# Patient Record
Sex: FEMALE | Race: WHITE | Employment: FULL TIME | URBAN - METROPOLITAN AREA
[De-identification: names, ages, dates, MRNs, and addresses within clinical notes are randomized per-mention and may not be internally consistent; named-entity substitution may affect disease eponyms.]

---

## 2017-02-02 PROBLEM — E78.5 DYSLIPIDEMIA: Status: ACTIVE | Noted: 2017-02-02

## 2017-02-02 PROBLEM — E04.9 NONTOXIC NODULAR GOITER: Status: ACTIVE | Noted: 2017-02-02

## 2017-02-02 PROBLEM — E03.8 HYPOTHYROIDISM DUE TO HASHIMOTO'S THYROIDITIS: Status: ACTIVE | Noted: 2017-02-02

## 2017-02-02 PROBLEM — E06.3 HYPOTHYROIDISM DUE TO HASHIMOTO'S THYROIDITIS: Status: ACTIVE | Noted: 2017-02-02

## 2017-02-07 PROBLEM — R53.82 CHRONIC FATIGUE: Status: ACTIVE | Noted: 2017-02-07

## 2017-02-07 PROBLEM — E78.00 PURE HYPERCHOLESTEROLEMIA: Status: ACTIVE | Noted: 2017-02-07

## 2017-02-07 PROBLEM — R60.0 ARM EDEMA: Status: ACTIVE | Noted: 2017-02-07

## 2017-03-23 ENCOUNTER — APPOINTMENT (OUTPATIENT)
Dept: LAB | Age: 57
End: 2017-03-23
Attending: INTERNAL MEDICINE
Payer: COMMERCIAL

## 2017-03-23 PROCEDURE — 88173 CYTOPATH EVAL FNA REPORT: CPT | Performed by: INTERNAL MEDICINE

## 2017-05-16 ENCOUNTER — HOSPITAL ENCOUNTER (OUTPATIENT)
Facility: HOSPITAL | Age: 57
Setting detail: OBSERVATION
Discharge: HOME | End: 2017-05-17
Attending: SURGERY | Admitting: SURGERY
Payer: COMMERCIAL

## 2017-05-16 ENCOUNTER — SURGERY (OUTPATIENT)
Age: 57
End: 2017-05-16

## 2017-05-16 ENCOUNTER — ANESTHESIA EVENT (OUTPATIENT)
Dept: SURGERY | Facility: HOSPITAL | Age: 57
End: 2017-05-16
Payer: COMMERCIAL

## 2017-05-16 ENCOUNTER — ANESTHESIA (OUTPATIENT)
Dept: SURGERY | Facility: HOSPITAL | Age: 57
End: 2017-05-16
Payer: COMMERCIAL

## 2017-05-16 PROCEDURE — 4A11X4G MONITORING OF PERIPHERAL NERVOUS ELECTRICAL ACTIVITY, INTRAOPERATIVE, EXTERNAL APPROACH: ICD-10-PCS | Performed by: SURGERY

## 2017-05-16 PROCEDURE — 0GTK0ZZ RESECTION OF THYROID GLAND, OPEN APPROACH: ICD-10-PCS | Performed by: SURGERY

## 2017-05-16 PROCEDURE — 88307 TISSUE EXAM BY PATHOLOGIST: CPT | Performed by: SURGERY

## 2017-05-16 RX ORDER — TRAMADOL HYDROCHLORIDE 50 MG/1
50 TABLET ORAL EVERY 6 HOURS PRN
Status: DISCONTINUED | OUTPATIENT
Start: 2017-05-16 | End: 2017-05-17

## 2017-05-16 RX ORDER — DEXTROSE, SODIUM CHLORIDE, AND POTASSIUM CHLORIDE 5; .45; .15 G/100ML; G/100ML; G/100ML
INJECTION INTRAVENOUS
Status: COMPLETED
Start: 2017-05-16 | End: 2017-05-16

## 2017-05-16 RX ORDER — HYDROCODONE BITARTRATE AND ACETAMINOPHEN 5; 325 MG/1; MG/1
2 TABLET ORAL EVERY 4 HOURS PRN
Status: DISCONTINUED | OUTPATIENT
Start: 2017-05-16 | End: 2017-05-17

## 2017-05-16 RX ORDER — MIRTAZAPINE 15 MG/1
45 TABLET, FILM COATED ORAL NIGHTLY
Status: DISCONTINUED | OUTPATIENT
Start: 2017-05-16 | End: 2017-05-17

## 2017-05-16 RX ORDER — SUMATRIPTAN 50 MG/1
100 TABLET, FILM COATED ORAL EVERY 2 HOUR PRN
Status: DISCONTINUED | OUTPATIENT
Start: 2017-05-16 | End: 2017-05-17

## 2017-05-16 RX ORDER — TOPIRAMATE 100 MG/1
200 TABLET, FILM COATED ORAL 2 TIMES DAILY
Status: DISCONTINUED | OUTPATIENT
Start: 2017-05-16 | End: 2017-05-16

## 2017-05-16 RX ORDER — ACETAMINOPHEN 325 MG/1
650 TABLET ORAL EVERY 4 HOURS PRN
Status: DISCONTINUED | OUTPATIENT
Start: 2017-05-16 | End: 2017-05-17

## 2017-05-16 RX ORDER — TOPIRAMATE 100 MG/1
200 TABLET, FILM COATED ORAL DAILY
Status: DISCONTINUED | OUTPATIENT
Start: 2017-05-17 | End: 2017-05-17

## 2017-05-16 RX ORDER — ONDANSETRON 2 MG/ML
4 INJECTION INTRAMUSCULAR; INTRAVENOUS EVERY 6 HOURS PRN
Status: DISCONTINUED | OUTPATIENT
Start: 2017-05-16 | End: 2017-05-17

## 2017-05-16 RX ORDER — CALCITRIOL 0.25 UG/1
0.25 CAPSULE, LIQUID FILLED ORAL DAILY
Status: DISCONTINUED | OUTPATIENT
Start: 2017-05-16 | End: 2017-05-17

## 2017-05-16 RX ORDER — HYDROCODONE BITARTRATE AND ACETAMINOPHEN 5; 325 MG/1; MG/1
1 TABLET ORAL EVERY 4 HOURS PRN
Status: DISCONTINUED | OUTPATIENT
Start: 2017-05-16 | End: 2017-05-17

## 2017-05-16 RX ORDER — SODIUM CHLORIDE, SODIUM LACTATE, POTASSIUM CHLORIDE, CALCIUM CHLORIDE 600; 310; 30; 20 MG/100ML; MG/100ML; MG/100ML; MG/100ML
INJECTION, SOLUTION INTRAVENOUS CONTINUOUS
Status: DISCONTINUED | OUTPATIENT
Start: 2017-05-16 | End: 2017-05-17

## 2017-05-16 RX ORDER — ATENOLOL 25 MG/1
25 TABLET ORAL
Status: DISCONTINUED | OUTPATIENT
Start: 2017-05-17 | End: 2017-05-17

## 2017-05-16 RX ORDER — IBUPROFEN 400 MG/1
400 TABLET ORAL EVERY 4 HOURS PRN
Status: DISCONTINUED | OUTPATIENT
Start: 2017-05-16 | End: 2017-05-17

## 2017-05-16 RX ORDER — DIAZEPAM 5 MG/1
5 TABLET ORAL NIGHTLY PRN
Status: DISCONTINUED | OUTPATIENT
Start: 2017-05-16 | End: 2017-05-17

## 2017-05-16 RX ORDER — ATORVASTATIN CALCIUM 40 MG/1
40 TABLET, FILM COATED ORAL NIGHTLY
Status: DISCONTINUED | OUTPATIENT
Start: 2017-05-16 | End: 2017-05-17

## 2017-05-16 RX ORDER — IBUPROFEN 600 MG/1
600 TABLET ORAL EVERY 4 HOURS PRN
Status: DISCONTINUED | OUTPATIENT
Start: 2017-05-16 | End: 2017-05-17

## 2017-05-16 RX ORDER — BUPIVACAINE HYDROCHLORIDE 5 MG/ML
INJECTION, SOLUTION EPIDURAL; INTRACAUDAL AS NEEDED
Status: DISCONTINUED | OUTPATIENT
Start: 2017-05-16 | End: 2017-05-16 | Stop reason: HOSPADM

## 2017-05-16 RX ORDER — NALOXONE HYDROCHLORIDE 0.4 MG/ML
80 INJECTION, SOLUTION INTRAMUSCULAR; INTRAVENOUS; SUBCUTANEOUS AS NEEDED
Status: DISCONTINUED | OUTPATIENT
Start: 2017-05-16 | End: 2017-05-16 | Stop reason: HOSPADM

## 2017-05-16 RX ORDER — DEXTROSE MONOHYDRATE 25 G/50ML
50 INJECTION, SOLUTION INTRAVENOUS
Status: DISCONTINUED | OUTPATIENT
Start: 2017-05-16 | End: 2017-05-16 | Stop reason: HOSPADM

## 2017-05-16 RX ORDER — LEVOTHYROXINE SODIUM 112 UG/1
112 TABLET ORAL
Status: DISCONTINUED | OUTPATIENT
Start: 2017-05-17 | End: 2017-05-17

## 2017-05-16 RX ORDER — IBUPROFEN 200 MG
200 TABLET ORAL EVERY 4 HOURS PRN
Status: DISCONTINUED | OUTPATIENT
Start: 2017-05-16 | End: 2017-05-17

## 2017-05-16 RX ORDER — DIPHENHYDRAMINE HCL 25 MG
25 CAPSULE ORAL NIGHTLY PRN
Status: DISCONTINUED | OUTPATIENT
Start: 2017-05-16 | End: 2017-05-17

## 2017-05-16 RX ORDER — CALCIUM GLUCONATE-DEXTROSE IV SOLN 2 GRAM/100ML-5% 2-5/100 GM/ML-%
2 SOLUTION INTRAVENOUS ONCE AS NEEDED
Status: ACTIVE | OUTPATIENT
Start: 2017-05-16 | End: 2017-05-16

## 2017-05-16 RX ORDER — DEXTROSE, SODIUM CHLORIDE, AND POTASSIUM CHLORIDE 5; .45; .15 G/100ML; G/100ML; G/100ML
INJECTION INTRAVENOUS CONTINUOUS
Status: DISCONTINUED | OUTPATIENT
Start: 2017-05-16 | End: 2017-05-17

## 2017-05-16 RX ORDER — IBUPROFEN 800 MG/1
800 TABLET ORAL EVERY 8 HOURS PRN
Qty: 30 TABLET | Refills: 1 | Status: SHIPPED | OUTPATIENT
Start: 2017-05-16 | End: 2017-07-15

## 2017-05-16 RX ORDER — DEXAMETHASONE SODIUM PHOSPHATE 4 MG/ML
4 VIAL (ML) INJECTION AS NEEDED
Status: DISCONTINUED | OUTPATIENT
Start: 2017-05-16 | End: 2017-05-16 | Stop reason: HOSPADM

## 2017-05-16 RX ORDER — TOPIRAMATE 100 MG/1
400 TABLET, FILM COATED ORAL NIGHTLY
Status: DISCONTINUED | OUTPATIENT
Start: 2017-05-16 | End: 2017-05-17

## 2017-05-16 RX ORDER — METOCLOPRAMIDE HYDROCHLORIDE 5 MG/ML
10 INJECTION INTRAMUSCULAR; INTRAVENOUS EVERY 6 HOURS PRN
Status: DISCONTINUED | OUTPATIENT
Start: 2017-05-16 | End: 2017-05-17

## 2017-05-16 RX ORDER — TRAMADOL HYDROCHLORIDE 50 MG/1
50 TABLET ORAL EVERY 6 HOURS PRN
Qty: 30 TABLET | Refills: 1 | Status: SHIPPED | OUTPATIENT
Start: 2017-05-16 | End: 2017-05-26

## 2017-05-16 RX ORDER — ONDANSETRON 2 MG/ML
4 INJECTION INTRAMUSCULAR; INTRAVENOUS AS NEEDED
Status: DISCONTINUED | OUTPATIENT
Start: 2017-05-16 | End: 2017-05-16 | Stop reason: HOSPADM

## 2017-05-16 NOTE — HISTORICAL OFFICE NOTE
The above referenced H&P on 5/8/2017 by her PCP was reviewed by Flor Freeman MD on 5/16/2017, the patient was examined and no significant changes have occurred in the patient's condition since the H&P was performed.   Risks and benefits were discussed, pr

## 2017-05-16 NOTE — PROGRESS NOTES
NURSING ADMISSION NOTE      Patient admitted via Cart  Oriented to room. Safety precautions initiated. Bed in low position. Call light in reach. PT ARRIVED TO ROOM FROM PACU AT THIS TIME IN STABLE CONDITION. A+OX3.  VITALS STABLE. 2L 02 PER NC. CPOX

## 2017-05-16 NOTE — BRIEF OP NOTE
Pre-Operative Diagnosis: NONTOXIC UNINODULAR GOITER     Post-Operative Diagnosis: NONTOXIC UNINODULAR GOITER     Procedure Performed:   TOTAL THYROIDECTOMY WITH NERVE MONITORING    Surgeon(s) and Role:     Chyna Vaughan MD - Primary    Assistant(s):

## 2017-05-16 NOTE — ANESTHESIA PREPROCEDURE EVALUATION
PRE-OP EVALUATION    Patient Name: Bryce Glasgow    Pre-op Diagnosis: NONTOXIC UNINODULAR GOITER    Procedure(s):  TOTAL THYROIDECTOMY WITH NERVE MONITORING    Surgeon(s) and Role:     Mark Jeffrey MD - Primary    Pre-op vitals reviewed. Temp: 98. headaches                     Past Surgical History    SINUS SURGERY        COLONOSCOPY  2007    Comment Repeated in 2008, Dr. Akira Matson, DONE WITH WATER    TONSILLECTOMY

## 2017-05-16 NOTE — ANESTHESIA POSTPROCEDURE EVALUATION
[facility] Patient Status:  Outpatient in a Bed   Age/Gender 65year old female MRN [id number]   [facility] SURGERY Attending Treva Boone MD   Hosp Day # 0 PCP Rafaela aRo MD       Anesthesia Post-op Note

## 2017-05-17 VITALS
BODY MASS INDEX: 38.46 KG/M2 | DIASTOLIC BLOOD PRESSURE: 69 MMHG | TEMPERATURE: 98 F | RESPIRATION RATE: 16 BRPM | SYSTOLIC BLOOD PRESSURE: 126 MMHG | HEART RATE: 78 BPM | WEIGHT: 209 LBS | OXYGEN SATURATION: 96 % | HEIGHT: 62 IN

## 2017-05-17 PROBLEM — E04.1 UNINODULAR GOITER: Status: ACTIVE | Noted: 2017-05-17

## 2017-05-17 PROCEDURE — 82310 ASSAY OF CALCIUM: CPT | Performed by: SURGERY

## 2017-05-17 RX ORDER — CALCITRIOL 0.5 UG/1
0.5 CAPSULE, LIQUID FILLED ORAL 2 TIMES DAILY
Qty: 28 CAPSULE | Refills: 0 | Status: SHIPPED | OUTPATIENT
Start: 2017-05-17 | End: 2017-05-31

## 2017-05-17 NOTE — PROGRESS NOTES
NURSING DISCHARGE NOTE    Discharged Home via Wheelchair. Accompanied by Family member and Support staff  Belongings Taken by patient/family DISCUSSED D/C INSTRUCTIONS WITH PT AND FAMILY . ANSWERED ALL QUESTIONS , VERBALIZED UNDERSTANDING .  SCRIPT FOR

## 2017-05-17 NOTE — OPERATIVE REPORT
[facility]    PATIENT'S NAME: Dior Brothers   ATTENDING PHYSICIAN: Camden Centeno M.D. OPERATING PHYSICIAN: Camden Centeno M.D.    PATIENT ACCOUNT#:   [de-identified]    LOCATION:  [address] [place] [address]  MEDICAL RECORD #:   [id number]       DATE OF BIRTH:  [birth year] area was prepped and draped into a sterile field. Lower Kocher neck incision was made with a 15 blade knife. Electric Bovie cautery used to separate the subcutaneous tissue, and superior and inferior flaps raised. Deep cervical fascia was identified.   Roby Simmonds 14:32:59  [facility] [id number]  TL/

## 2017-05-17 NOTE — PAYOR COMM NOTE
Attending Physician: Marielena Starr MD    Review Type: ADMISSION   Reviewer: Milan Hammans       Date: May 17, 2017 - 9:19 AM  Payor: [id number] Number: N/A  Admit date: 5/16/2017 10:16 AM     [place]     ( (CEPACOL (SUGAR FREE)) 1 lozenge     Date Action Dose Route User    5/17/2017 0848 Given 1 lozenge Oral Demetris Archer RN    5/17/2017 0357 Given 1 lozenge Oral Ousmane Mamadou, RN    5/16/2017 2144 Given 1 lozenge Oral Ousmane Mamadou, RN    5/16/2017 2123 Gi Dose Route User    5/17/2017 0521 Given 112 mcg Oral Daysi Coleman RN      mirtazapine (REMERON) tab 45 mg     Date Action Dose Route User    5/16/2017 2035 Given 45 mg Oral Daysi Coleman RN      topiramate (Topamax) tab 200 mg     Date Action Dose Route

## 2017-05-17 NOTE — PROGRESS NOTES
[facility]  Progress Note    [place] Patient Status:  Outpatient in a Bed    3/26/1960 MRN [id number]   [facility] 3NW-A Attending Marielena Starr MD   Hosp Day # 1 PCP Anuel Flores MD     Subjective:    Patient reports

## 2017-05-19 NOTE — PROGRESS NOTES
Quick Note:    Quirino Harrington, please notify patient with final pathology showed no cancer  ______

## 2017-05-25 PROCEDURE — 83970 ASSAY OF PARATHORMONE: CPT | Performed by: INTERNAL MEDICINE

## 2017-07-06 PROCEDURE — 83970 ASSAY OF PARATHORMONE: CPT | Performed by: INTERNAL MEDICINE

## 2017-07-18 PROBLEM — E04.9 NONTOXIC NODULAR GOITER: Status: RESOLVED | Noted: 2017-02-02 | Resolved: 2017-07-18

## 2017-08-08 PROBLEM — I10 ESSENTIAL HYPERTENSION: Status: ACTIVE | Noted: 2017-08-08

## 2017-08-31 DIAGNOSIS — G43.901: ICD-10-CM

## 2017-08-31 NOTE — TELEPHONE ENCOUNTER
Medication: Topamax    Date of last refill: 12/8/16 for #270/3 additional refills  [place]    Last office visit: 12/8/16  Due back to clinic per last office note:  1 year  Date next office visit scheduled:  not yet s

## 2017-09-27 PROCEDURE — 82607 VITAMIN B-12: CPT | Performed by: INTERNAL MEDICINE

## 2017-10-24 DIAGNOSIS — G43.901: ICD-10-CM

## 2017-10-24 NOTE — TELEPHONE ENCOUNTER
Medication: Topiramate 200mg     Date of last refill: 12/8/16  [place]     Last office visit: 12/8/16  Due back to clinic per last office note:  1 year  Date next office visit scheduled:  No appointment     Last OV note

## 2017-11-01 NOTE — TELEPHONE ENCOUNTER
Pt almost due for 1 year follow up (due 12/8/17). LMTCB to schedule appt, then we can provide 1 90-day supply to mail order pharmacy, to cover until appt date. Upon return call, please assist with scheduling follow up.     Medication: MIRTAZAPINE 45 MG Or

## 2018-01-03 ENCOUNTER — TELEPHONE (OUTPATIENT)
Dept: NEUROLOGY | Facility: CLINIC | Age: 58
End: 2018-01-03

## 2018-01-05 ENCOUNTER — OFFICE VISIT (OUTPATIENT)
Dept: NEUROLOGY | Facility: CLINIC | Age: 58
End: 2018-01-05

## 2018-01-05 VITALS
WEIGHT: 194 LBS | DIASTOLIC BLOOD PRESSURE: 84 MMHG | RESPIRATION RATE: 18 BRPM | HEART RATE: 78 BPM | BODY MASS INDEX: 34 KG/M2 | SYSTOLIC BLOOD PRESSURE: 124 MMHG

## 2018-01-05 DIAGNOSIS — R20.2 TINGLING IN EXTREMITIES: ICD-10-CM

## 2018-01-05 DIAGNOSIS — G43.001 MIGRAINE WITHOUT AURA AND WITH STATUS MIGRAINOSUS, NOT INTRACTABLE: ICD-10-CM

## 2018-01-05 DIAGNOSIS — G43.009 NONINTRACTABLE MIGRAINE, UNSPECIFIED MIGRAINE TYPE: ICD-10-CM

## 2018-01-05 DIAGNOSIS — R25.1 TREMOR OF BOTH HANDS: ICD-10-CM

## 2018-01-05 DIAGNOSIS — G43.831 MENSTRUAL MIGRAINE, WITH INTRACTABLE MIGRAINE, SO STATED, WITH STATUS MIGRAINOSUS: Primary | ICD-10-CM

## 2018-01-05 DIAGNOSIS — F32.4 MAJOR DEPRESSIVE DISORDER WITH SINGLE EPISODE, IN PARTIAL REMISSION (HCC): ICD-10-CM

## 2018-01-05 DIAGNOSIS — F32.89 OTHER DEPRESSION: ICD-10-CM

## 2018-01-05 PROCEDURE — 99215 OFFICE O/P EST HI 40 MIN: CPT | Performed by: OTHER

## 2018-01-05 RX ORDER — MIRTAZAPINE 45 MG/1
TABLET, FILM COATED ORAL
Qty: 90 TABLET | Refills: 3 | Status: SHIPPED | OUTPATIENT
Start: 2018-01-05 | End: 2018-06-07

## 2018-01-05 RX ORDER — SUMATRIPTAN 100 MG/1
100 TABLET, FILM COATED ORAL AS NEEDED
Qty: 27 TABLET | Refills: 3 | Status: SHIPPED | OUTPATIENT
Start: 2018-01-05 | End: 2018-06-07

## 2018-01-05 NOTE — PATIENT INSTRUCTIONS
Refill policies:    • Allow 2-3 business days for refills; controlled substances may take longer.   • Contact your pharmacy at least 5 days prior to running out of medication and have them send an electronic request or submit request through the [facility] have a procedure or additional testing performed. [facility]) will contact your insurance carrier to obtain pre-certification or prior authorization.     Unfortunately, CIRA has seen an increase in denial of payment even though the p

## 2018-01-05 NOTE — PROGRESS NOTES
[facility] in [place] with [facility]  Neurology - Clinic Follow up  2018    Roseanne Mathias Patient Status:  No patient class for patient encounter    3/26/1960 MRN [id number]   COUM RHABDOMYOLYSIS AFTER ABLATION, DONE                WITH WATER  9/24/09: HYSTERECTOMY      Comment: Supracervical hyst & BSO  No date: SINUS SURGERY    No date: THYROIDECTOMY  No date: TONSILLECTOMY  3/11: TOTAL KNEE REPLACEMENT Right  No date: TUBAL Sherlyn Rae 1   TABLETS DAILY Disp:  Rfl:    Pantoprazole Sodium 20 MG Oral Tab EC Take 1 tablet (20 mg total) by mouth 2 (two) times daily before meals. Disp: 28 tablet Rfl: 0   diazepam (VALIUM) 5 MG Oral Tab Take 5 mg by mouth nightly as needed.    Disp:  Rfl: 0 Light touch, pin and vibration in ankles and fingers,  position sense is normal  DTRs   Symmetric 2/4 in all limbs,   No Babinski sign,   COORDINATION: Normal FTN and HTS tests,   GAIT:  Normal gait, can do  tandem walk, romberg test is negative,         P mirtazapine 45 MG Oral Tab    (G43.009) Nonintractable migraine, unspecified migraine type  Plan: SUMAtriptan Succinate (IMITREX) 100 MG Oral Tab      Summery:  She has common  Migraine, better controlled,   Depression is better controlled,   Keep on  barbara

## 2018-01-05 NOTE — PROGRESS NOTES
Patient here to follow up regarding headaches. States that she has been doing ok. Has been having tingling in hands and feet.

## 2018-01-22 ENCOUNTER — TELEPHONE (OUTPATIENT)
Dept: NEUROLOGY | Facility: CLINIC | Age: 58
End: 2018-01-22

## 2018-01-22 RX ORDER — ONDANSETRON 4 MG/1
4 TABLET, ORALLY DISINTEGRATING ORAL EVERY 8 HOURS PRN
Qty: 20 TABLET | Refills: 0 | Status: SHIPPED | OUTPATIENT
Start: 2018-01-22 | End: 2018-03-08

## 2018-01-24 ENCOUNTER — LAB ENCOUNTER (OUTPATIENT)
Dept: LAB | Age: 58
End: 2018-01-24
Attending: Other
Payer: COMMERCIAL

## 2018-01-24 DIAGNOSIS — R20.2 PARESTHESIA: Primary | ICD-10-CM

## 2018-01-24 PROCEDURE — 84165 PROTEIN E-PHORESIS SERUM: CPT

## 2018-01-24 PROCEDURE — 83883 ASSAY NEPHELOMETRY NOT SPEC: CPT

## 2018-01-24 PROCEDURE — 86235 NUCLEAR ANTIGEN ANTIBODY: CPT

## 2018-01-24 PROCEDURE — 84207 ASSAY OF VITAMIN B-6: CPT

## 2018-01-24 PROCEDURE — 86334 IMMUNOFIX E-PHORESIS SERUM: CPT

## 2018-01-24 PROCEDURE — 84446 ASSAY OF VITAMIN E: CPT

## 2018-01-24 PROCEDURE — 86431 RHEUMATOID FACTOR QUANT: CPT

## 2018-01-25 LAB — RHEUMATOID FACT SERPL-ACNC: <10 IU/ML (ref ?–15)

## 2018-01-26 LAB
SSA AUTOAB: <100 AU/ML (ref ?–100)
SSB AUTOAB: <100 AU/ML (ref ?–100)

## 2018-01-28 LAB
ALPHA-TOCOPHEROL (VIT E) -MG/L: 7.4 MG/L
GAMMA-TOCOPHEROL (VIT E) -MG/L: 1.3 MG/L
VITAMIN B6: 12 NMOL/L

## 2018-01-30 ENCOUNTER — TELEPHONE (OUTPATIENT)
Dept: NEUROLOGY | Facility: CLINIC | Age: 58
End: 2018-01-30

## 2018-01-30 LAB
A/G RATIO: 1.47
ALBUMIN, SERUM: 4.34 G/DL (ref 3.5–4.8)
ALPHA-1 GLOBULIN: 0.24 G/DL (ref 0.1–0.3)
ALPHA-2 GLOBULIN: 0.81 G/DL (ref 0.6–1)
BETA GLOBULIN: 0.81 G/DL (ref 0.7–1.2)
GAMMA GLOBULIN: 1.1 G/DL (ref 0.6–1.6)
KAPPA FREE LIGHT CHAIN: 3.15 MG/DL (ref 0.33–1.94)
KAPPA/LAMBDA FLC RATIO: 2.05 (ref 0.26–1.65)
LAMBDA FREE LIGHT CHAIN: 1.53 MG/DL (ref 0.57–2.63)
TOTAL PROTEIN,SERUM: 7.3 G/DL (ref 6.1–8.3)

## 2018-01-30 NOTE — TELEPHONE ENCOUNTER
----- Message from Baljinder Mendoza MD sent at 1/30/2018  9:09 AM CST -----  She has high kappa protein of unclear significance,  need to see hematologist

## 2018-01-30 NOTE — TELEPHONE ENCOUNTER
----- Message from Devendra Bland MD sent at 1/29/2018  1:34 PM CST -----  V B-6 is low, take B complex daily or B6 one a day,

## 2018-01-30 NOTE — TELEPHONE ENCOUNTER
Patient notified of results and recommendations below. She verbalized understanding. Patient stated she will discuss with Dr Teresa Nuno at [facility] appt 2/7/18.

## 2018-02-07 ENCOUNTER — OFFICE VISIT (OUTPATIENT)
Dept: ELECTROPHYSIOLOGY | Facility: HOSPITAL | Age: 58
End: 2018-02-07
Attending: Other
Payer: COMMERCIAL

## 2018-02-07 DIAGNOSIS — M25.561 ARTHRALGIA OF BOTH LOWER LEGS: ICD-10-CM

## 2018-02-07 DIAGNOSIS — R25.1 TREMOR OF BOTH HANDS: ICD-10-CM

## 2018-02-07 DIAGNOSIS — R20.2 TINGLING IN EXTREMITIES: ICD-10-CM

## 2018-02-07 DIAGNOSIS — M25.562 ARTHRALGIA OF BOTH LOWER LEGS: ICD-10-CM

## 2018-02-07 PROCEDURE — 84156 ASSAY OF PROTEIN URINE: CPT | Performed by: INTERNAL MEDICINE

## 2018-02-07 PROCEDURE — 83883 ASSAY NEPHELOMETRY NOT SPEC: CPT | Performed by: INTERNAL MEDICINE

## 2018-02-07 PROCEDURE — 95913 NRV CNDJ TEST 13/> STUDIES: CPT | Performed by: OTHER

## 2018-02-07 PROCEDURE — 36415 COLL VENOUS BLD VENIPUNCTURE: CPT | Performed by: INTERNAL MEDICINE

## 2018-02-07 PROCEDURE — 95886 MUSC TEST DONE W/N TEST COMP: CPT | Performed by: OTHER

## 2018-02-07 PROCEDURE — 86335 IMMUNFIX E-PHORSIS/URINE/CSF: CPT | Performed by: INTERNAL MEDICINE

## 2018-02-07 NOTE — PROCEDURES
[facility] [place] 2] [address]  [place], [address] [address]      PATIENT'S NAME: Tahir Walker   REFERRING PHYSICIAN: Berny Eisenberg M.D.    PATIENT ACCOUNT #: [de-identified] LOCATION: [facility]   MEDICAL RECORD #: [id number] DATE OF BIRTH: [birth year]

## 2018-03-08 ENCOUNTER — OFFICE VISIT (OUTPATIENT)
Dept: NEUROLOGY | Facility: CLINIC | Age: 58
End: 2018-03-08

## 2018-03-08 VITALS
SYSTOLIC BLOOD PRESSURE: 128 MMHG | WEIGHT: 197 LBS | HEART RATE: 84 BPM | BODY MASS INDEX: 35 KG/M2 | DIASTOLIC BLOOD PRESSURE: 78 MMHG | RESPIRATION RATE: 16 BRPM

## 2018-03-08 DIAGNOSIS — G43.011 INTRACTABLE MIGRAINE WITHOUT AURA AND WITH STATUS MIGRAINOSUS: ICD-10-CM

## 2018-03-08 DIAGNOSIS — F32.0 CURRENT MILD EPISODE OF MAJOR DEPRESSIVE DISORDER WITHOUT PRIOR EPISODE (HCC): ICD-10-CM

## 2018-03-08 DIAGNOSIS — M54.2 NECK PAIN, BILATERAL: ICD-10-CM

## 2018-03-08 DIAGNOSIS — M25.519 ARTHRALGIA OF SHOULDER, UNSPECIFIED LATERALITY: ICD-10-CM

## 2018-03-08 DIAGNOSIS — E78.5 DYSLIPIDEMIA: ICD-10-CM

## 2018-03-08 DIAGNOSIS — R53.82 CHRONIC FATIGUE: ICD-10-CM

## 2018-03-08 DIAGNOSIS — R20.2 TINGLING IN EXTREMITIES: Primary | ICD-10-CM

## 2018-03-08 DIAGNOSIS — G47.33 OSA (OBSTRUCTIVE SLEEP APNEA): ICD-10-CM

## 2018-03-08 PROBLEM — M25.50 JOINT PAIN: Status: ACTIVE | Noted: 2018-03-08

## 2018-03-08 PROCEDURE — 96372 THER/PROPH/DIAG INJ SC/IM: CPT | Performed by: OTHER

## 2018-03-08 PROCEDURE — 99214 OFFICE O/P EST MOD 30 MIN: CPT | Performed by: OTHER

## 2018-03-08 RX ORDER — METHYLPREDNISOLONE 4 MG/1
TABLET ORAL
Qty: 1 PACKAGE | Refills: 0 | Status: SHIPPED | OUTPATIENT
Start: 2018-03-08 | End: 2018-05-23 | Stop reason: ALTCHOICE

## 2018-03-08 RX ORDER — KETOROLAC TROMETHAMINE 30 MG/ML
30 INJECTION, SOLUTION INTRAMUSCULAR; INTRAVENOUS ONCE
Status: COMPLETED | OUTPATIENT
Start: 2018-03-08 | End: 2018-03-08

## 2018-03-08 RX ORDER — DEXAMETHASONE SODIUM PHOSPHATE 10 MG/ML
10 INJECTION, SOLUTION INTRAMUSCULAR; INTRAVENOUS ONCE
Status: COMPLETED | OUTPATIENT
Start: 2018-03-08 | End: 2018-03-08

## 2018-03-08 RX ADMIN — DEXAMETHASONE SODIUM PHOSPHATE 10 MG: 10 INJECTION, SOLUTION INTRAMUSCULAR; INTRAVENOUS at 12:06:00

## 2018-03-08 RX ADMIN — KETOROLAC TROMETHAMINE 30 MG: 30 INJECTION, SOLUTION INTRAMUSCULAR; INTRAVENOUS at 12:07:00

## 2018-03-08 NOTE — PATIENT INSTRUCTIONS
Refill policies:    • Allow 2-3 business days for refills; controlled substances may take longer.   • Contact your pharmacy at least 5 days prior to running out of medication and have them send an electronic request or submit request through the [facility] recommended that you have a procedure or additional testing performed. [facility]) will contact your insurance carrier to obtain pre-certification or prior authorization.     Unfortunately, CIRA has seen an increase in denial of paym

## 2018-03-08 NOTE — PROCEDURES
Toradol 30mg and Decadron 10mg IM injections given per VORB. See MAR for admin details. Consent form signed prior to injections. Pt tolerated well.

## 2018-03-08 NOTE — PROGRESS NOTES
[facility] in [place] with [facility]  Neurology - Clinic Follow up  2018    Miguel A Jaquez Patient Status:  No patient class for patient encounter    3/26/1960 MRN [id number]   [facility] Comment: PT HAD RHABDOMYOLYSIS AFTER ABLATION, DONE                WITH WATER  9/24/09: HYSTERECTOMY      Comment: Supracervical hyst & BSO  No date: SINUS SURGERY    No date: THYROIDECTOMY  No date: TONSILLECTOMY  3/11: TOTAL KNEE REPLACEMENT Right  No da (ZOFRAN ODT) 4 MG Oral Tablet Dispersible Take 1 tablet (4 mg total) by mouth every 8 (eight) hours as needed for Nausea. Disp: 20 tablet Rfl: 0   Atorvastatin Calcium 40 MG Oral Tab Take 40 mg by mouth nightly.  Disp:  Rfl:    Venlafaxine HCl  MG Ora both arms and legs, no tremor of any kind;  Sensory: decreased Light touch, pin and vibration in ankles and fingers,  position sense is normal  DTRs   Symmetric 2/4 in all limbs,   No Babinski sign,   COORDINATION: Normal FTN and HTS tests,   GAIT:  Normal 30         MG/ML injection 30 mg    (F32.0) Current mild episode of major depressive disorder without prior episode (HCC)      Rikki:  She has common  Migraine,  Flare up with her current stress,   Will give her Toradol and decadron injection today, medr

## 2018-03-08 NOTE — PROGRESS NOTES
Pt here for follow up for headaches. Pt currently has a throbbing headache, which has been present for one week.

## 2018-05-01 NOTE — TELEPHONE ENCOUNTER
From: Sandra Taveras  Sent: 4/30/2018 6:09 PM CDT  Subject: Medication Renewal Request    Sandra Taveras would like a refill of the following medications:     Diclofenac Sodium 50 MG Oral Tab EC Reinier Reddy MD]   Patient Comment: also can i get a new p

## 2018-05-01 NOTE — TELEPHONE ENCOUNTER
Medication: Diclofenac 50mg     Date of last refill: 3/8/18  [place]     Last office visit: 3/8/18  Due back to clinic per last office note:  3 months  Date next office visit scheduled:  6/7/18    Last OV note recommenda

## 2018-06-07 ENCOUNTER — OFFICE VISIT (OUTPATIENT)
Dept: NEUROLOGY | Facility: CLINIC | Age: 58
End: 2018-06-07

## 2018-06-07 VITALS
HEART RATE: 88 BPM | WEIGHT: 191 LBS | SYSTOLIC BLOOD PRESSURE: 110 MMHG | DIASTOLIC BLOOD PRESSURE: 80 MMHG | BODY MASS INDEX: 35 KG/M2

## 2018-06-07 DIAGNOSIS — R25.1 TREMOR OF BOTH HANDS: ICD-10-CM

## 2018-06-07 DIAGNOSIS — G43.009 NONINTRACTABLE MIGRAINE, UNSPECIFIED MIGRAINE TYPE: ICD-10-CM

## 2018-06-07 DIAGNOSIS — K21.00 GASTROESOPHAGEAL REFLUX DISEASE WITH ESOPHAGITIS: ICD-10-CM

## 2018-06-07 DIAGNOSIS — M35.3 POLYMYALGIA (HCC): ICD-10-CM

## 2018-06-07 DIAGNOSIS — R63.5 ABNORMAL WEIGHT GAIN: ICD-10-CM

## 2018-06-07 DIAGNOSIS — R53.82 CHRONIC FATIGUE: ICD-10-CM

## 2018-06-07 DIAGNOSIS — R10.13 EPIGASTRIC DISCOMFORT: ICD-10-CM

## 2018-06-07 DIAGNOSIS — F32.89 OTHER DEPRESSION: ICD-10-CM

## 2018-06-07 DIAGNOSIS — G43.001 MIGRAINE WITHOUT AURA AND WITH STATUS MIGRAINOSUS, NOT INTRACTABLE: ICD-10-CM

## 2018-06-07 DIAGNOSIS — R20.2 TINGLING IN EXTREMITIES: ICD-10-CM

## 2018-06-07 DIAGNOSIS — G43.831 MENSTRUAL MIGRAINE, WITH INTRACTABLE MIGRAINE, SO STATED, WITH STATUS MIGRAINOSUS: Primary | ICD-10-CM

## 2018-06-07 PROCEDURE — 96372 THER/PROPH/DIAG INJ SC/IM: CPT | Performed by: OTHER

## 2018-06-07 PROCEDURE — 99214 OFFICE O/P EST MOD 30 MIN: CPT | Performed by: OTHER

## 2018-06-07 RX ORDER — DEXAMETHASONE SODIUM PHOSPHATE 10 MG/ML
10 INJECTION, SOLUTION INTRAMUSCULAR; INTRAVENOUS ONCE
Status: COMPLETED | OUTPATIENT
Start: 2018-06-07 | End: 2018-06-07

## 2018-06-07 RX ORDER — ONDANSETRON 4 MG/1
4 TABLET, ORALLY DISINTEGRATING ORAL EVERY 8 HOURS PRN
Qty: 20 TABLET | Refills: 1 | Status: SHIPPED | OUTPATIENT
Start: 2018-06-07 | End: 2019-10-22 | Stop reason: ALTCHOICE

## 2018-06-07 RX ORDER — SUMATRIPTAN 100 MG/1
100 TABLET, FILM COATED ORAL AS NEEDED
Qty: 27 TABLET | Refills: 3 | Status: SHIPPED | OUTPATIENT
Start: 2018-06-07 | End: 2019-01-28

## 2018-06-07 RX ORDER — MIRTAZAPINE 45 MG/1
TABLET, FILM COATED ORAL
Qty: 90 TABLET | Refills: 3 | Status: SHIPPED | OUTPATIENT
Start: 2018-06-07 | End: 2019-01-28

## 2018-06-07 RX ORDER — KETOROLAC TROMETHAMINE 30 MG/ML
30 INJECTION, SOLUTION INTRAMUSCULAR; INTRAVENOUS ONCE
Status: COMPLETED | OUTPATIENT
Start: 2018-06-07 | End: 2018-06-07

## 2018-06-07 RX ADMIN — KETOROLAC TROMETHAMINE 30 MG: 30 INJECTION, SOLUTION INTRAMUSCULAR; INTRAVENOUS at 12:05:00

## 2018-06-07 RX ADMIN — DEXAMETHASONE SODIUM PHOSPHATE 10 MG: 10 INJECTION, SOLUTION INTRAMUSCULAR; INTRAVENOUS at 12:04:00

## 2018-06-07 NOTE — PROGRESS NOTES
[facility] in [place] with [facility]  Neurology - Clinic Follow up  2018    Jaqueline Ramirez Patient Status:  No patient class for patient encounter    3/26/1960 MRN [id number]   NDQE HISTORY:  Past Surgical History:  2013: ARTHROSCOPY OF JOINT UNLISTED Right      Comment: KNEE  No date:   2007: COLONOSCOPY      Comment: Repeated in , Dr. Sheridan Manley: CYST ASPIRATION LEFT  No date: ENDOMETRIAL ABLATION      Comment: PT HAD RHABD Oral Cap 10,000 Units once a week. Sundays  Disp:  Rfl:    Calcium Carb-Cholecalciferol ([employer] CALCIUM 600 & VITAMIN D3 OR) daily.    Disp:  Rfl:    VENTOLIN  (90 Base) MCG/ACT Inhalation Aero Soln USE 1-2 INHALATIONS EVERY 4-6 HOURS AS NEEDED FOR WHEE to XII, visual field is full, EOMI, pupil symmetrical, light reflexes are present, fundus exam is normal. Face symmetrical with normal sensation, hearing normal, shoulder shrugging normal.   Motor:  NO drift.  TARIQ intact, normal tone, normal strength in bot Medications    mirtazapine 45 MG Oral Tab    Tremor of both hands    Chronic fatigue    Tingling in extremities      Other Visit Diagnoses     Epigastric discomfort        Relevant Medications    B Complex Vitamins (VITAMIN B COMPLEX OR)    Diclofenac Sodi Oral Tablet         Dispersible    (R25.1) Tremor of both hands    (G43.001) Migraine without aura and with status migrainosus, not intractable  Plan: topiramate 200 MG Oral Tab, ketorolac         tromethamine (TORADOL) 30 MG/ML injection 30         mg, de

## 2018-06-07 NOTE — PATIENT INSTRUCTIONS
Refill policies:    • Allow 2-3 business days for refills; controlled substances may take longer.   • Contact your pharmacy at least 5 days prior to running out of medication and have them send an electronic request or submit request through the “request re entire amount billed. Precertification and Prior Authorizations: If your physician has recommended that you have a procedure or additional testing performed.   [facility]) will contact your insurance carrier to obtain pre-certi

## 2018-06-15 ENCOUNTER — TELEPHONE (OUTPATIENT)
Dept: NEUROLOGY | Facility: CLINIC | Age: 58
End: 2018-06-15

## 2018-06-15 DIAGNOSIS — G43.011 INTRACTABLE MIGRAINE WITHOUT AURA AND WITH STATUS MIGRAINOSUS: Primary | ICD-10-CM

## 2018-06-15 NOTE — TELEPHONE ENCOUNTER
Patient to start [address] [place] [address] for migraines. While in office, patient completed and signed a Service Request Form. Rx benefits confirmed with patient. Completed Service Request Form endorsed to Dr. Jayesh Mallory for signature.

## 2018-06-19 NOTE — TELEPHONE ENCOUNTER
SRF reviewed and signed by Dr. Rosa Zabala. Completed Service Request Form faxed to [facility] along with face sheet and insurance card. Pt to receive 2 free months of medication from [facility] program.  Service Request Form sent to scanning.   Lyle navarro

## 2018-06-28 NOTE — TELEPHONE ENCOUNTER
Patient called back. Relayed below. Informed patient to expect a call from Linda Vizcarra and the specialty pharmacy to dispense two free month trials. Patient verbalized understanding. No further questions.

## 2018-06-28 NOTE — TELEPHONE ENCOUNTER
Received approval from [facility] for [address]. Aimovig 70 mg/ml autoinjector approved, effective 06/27/18 through 12/27/18    No PA# provided.    Patient ID# [id number]  Request ID# [id number]      Left a detailed message on patient's confidential voicemail (ok

## 2018-07-26 NOTE — TELEPHONE ENCOUNTER
Spoke with patient, provided [facility]  numbers to call. Explained that pharmacy may be backed up with new starts as this is a new medication. Verbalized understanding. Appreciative of call.

## 2018-07-30 DIAGNOSIS — R10.13 EPIGASTRIC DISCOMFORT: ICD-10-CM

## 2018-07-30 DIAGNOSIS — M35.3 POLYMYALGIA (HCC): ICD-10-CM

## 2018-07-30 DIAGNOSIS — G43.831 MENSTRUAL MIGRAINE, WITH INTRACTABLE MIGRAINE, SO STATED, WITH STATUS MIGRAINOSUS: ICD-10-CM

## 2018-07-30 DIAGNOSIS — K21.00 GASTROESOPHAGEAL REFLUX DISEASE WITH ESOPHAGITIS: ICD-10-CM

## 2018-07-31 NOTE — TELEPHONE ENCOUNTER
Medication: Diclofenac 50mg     Date of last refill: 6/7/18  [place]     Last office visit: 6/7/2018  Due back to clinic per last office note:  3 months  Date next office visit scheduled:  Future Appointments  Date Time

## 2018-08-01 NOTE — TELEPHONE ENCOUNTER
Rec'd call on 7/31/18 from [employer] from [employer] indicating that the Dx code is a non-covered dx for this medication. Asking that this be updated and faxed back. Can just cross off on original form and fax back.     Note that the dx code placed on the form

## 2018-08-16 ENCOUNTER — TELEPHONE (OUTPATIENT)
Dept: NEUROLOGY | Facility: CLINIC | Age: 58
End: 2018-08-16

## 2018-08-16 DIAGNOSIS — G43.011 INTRACTABLE MIGRAINE WITHOUT AURA AND WITH STATUS MIGRAINOSUS: ICD-10-CM

## 2018-08-16 RX ORDER — METHYLPREDNISOLONE 4 MG/1
TABLET ORAL
Qty: 1 PACKAGE | Refills: 0 | Status: SHIPPED | OUTPATIENT
Start: 2018-08-16 | End: 2018-09-06 | Stop reason: ALTCHOICE

## 2018-08-16 NOTE — TELEPHONE ENCOUNTER
S:  Patient states she has been trying to get a hold of someone regarding getting her free Radha Girt, but has been on hold up to 45 minutes without being able to speak with anyone. Patient states she is having daily headaches, some days worse than others.  A

## 2018-08-16 NOTE — TELEPHONE ENCOUNTER
Received approval of [address] [place] [address], Request ID [id number], valid 6/27/18-12/27/18.  Left message on patient identified voicemail (ok with HIPPA consent) informing patient and asking if she would like us to send RX to pharmacy while she is waiting for free trial fro

## 2018-08-17 NOTE — TELEPHONE ENCOUNTER
Both prescriptions signed by Dr. Ledy Armstrong. Left detailed message on confidential voice mail (OK per HIPAA) relaying this. Encouraged pt to c/b office with any questions.

## 2018-09-06 ENCOUNTER — TELEPHONE (OUTPATIENT)
Dept: NEUROLOGY | Facility: CLINIC | Age: 58
End: 2018-09-06

## 2018-09-06 ENCOUNTER — OFFICE VISIT (OUTPATIENT)
Dept: NEUROLOGY | Facility: CLINIC | Age: 58
End: 2018-09-06
Payer: COMMERCIAL

## 2018-09-06 VITALS
DIASTOLIC BLOOD PRESSURE: 76 MMHG | HEART RATE: 96 BPM | HEIGHT: 62 IN | BODY MASS INDEX: 35.7 KG/M2 | WEIGHT: 194 LBS | SYSTOLIC BLOOD PRESSURE: 122 MMHG | RESPIRATION RATE: 16 BRPM

## 2018-09-06 DIAGNOSIS — R25.1 TREMOR OF BOTH HANDS: ICD-10-CM

## 2018-09-06 DIAGNOSIS — G43.001 MIGRAINE WITHOUT AURA AND WITH STATUS MIGRAINOSUS, NOT INTRACTABLE: ICD-10-CM

## 2018-09-06 DIAGNOSIS — M54.2 NECK PAIN, BILATERAL: ICD-10-CM

## 2018-09-06 DIAGNOSIS — R20.2 TINGLING IN EXTREMITIES: Primary | ICD-10-CM

## 2018-09-06 DIAGNOSIS — F51.01 PRIMARY INSOMNIA: ICD-10-CM

## 2018-09-06 DIAGNOSIS — G43.011 INTRACTABLE MIGRAINE WITHOUT AURA AND WITH STATUS MIGRAINOSUS: ICD-10-CM

## 2018-09-06 PROCEDURE — 99214 OFFICE O/P EST MOD 30 MIN: CPT | Performed by: OTHER

## 2018-09-06 NOTE — TELEPHONE ENCOUNTER
Cece RN from [facility] returned call. She states that she sees in their system that all forms were received, but then somehow the pts case was closed. Therefore, delivery of the free medication has not been addressed.   She has escalated the case, and will

## 2018-09-06 NOTE — TELEPHONE ENCOUNTER
Pt seen in office today, and is requesting status update on Aimovig free trial doses. States she has contacted the [employer] several times and was put on hold for 30-60 minutes each time, never getting to speak to anyone. She is very frustrated.     Pt w

## 2018-09-06 NOTE — PROGRESS NOTES
[facility] in [place] with [facility]  Neurology - Clinic Follow up  2018    Robert Zhu Patient Status:  No patient class for patient encounter    3/26/1960 MRN [id number]   NDIM ITCHING  Morphine Sulfate        HIVES, ITCHING  Radiology Contrast *        Comment:ALLERGIC TO SEAFOOD, NEVER HAD AN ACTUAL REACTION             TO CONTRAST  Seasonal                  Meperidine              ITCHING    MEDICATIONS: EMR reviewed    Meryl nightly. Disp:  Rfl:          REVIEW OF SYSTEMS:    General: denies any fever or chills. Eye: no pain or redness;  Ear, , no hearing change, no throat pain or soreness; Respiratory: Denies: Difficulty Breathing, Chronic Cough and Wheezing.   Cardiovasc romberg test is negative,     EMG:    IMPRESSION:  This is an abnormal study.   The findings are highly suggestive of chronic mild bilateral lower cervical and lower lumbar root irritation or chronic radiculopathy in both C6-7, C7-8 and L4-5, L5-S1 levels b on her own,     Depression is better controlled, flare up with stress,   Keep on  remeron to 45 mg qhs,   Keep on Topamax to 200 mg am, 400 mg pm,  and Imitrex PRN,  Get aimovig monthly injection,   Even this office is too far, but she chose to come back t

## 2018-09-06 NOTE — PATIENT INSTRUCTIONS
Refill policies:    • Allow 2-3 business days for refills; controlled substances may take longer.   • Contact your pharmacy at least 5 days prior to running out of medication and have them send an electronic request or submit request through the “request re entire amount billed. Precertification and Prior Authorizations: If your physician has recommended that you have a procedure or additional testing performed.   [facility]) will contact your insurance carrier to obtain pre-certi

## 2018-09-13 NOTE — TELEPHONE ENCOUNTER
Left a detailed message on patient's confidential voicemail (ok per HIPPA) relaying update below. Encouraged to call office back with any further questions or concerns.

## 2018-09-27 DIAGNOSIS — M35.3 POLYMYALGIA (HCC): ICD-10-CM

## 2018-09-27 DIAGNOSIS — R10.13 EPIGASTRIC DISCOMFORT: ICD-10-CM

## 2018-09-27 DIAGNOSIS — K21.00 GASTROESOPHAGEAL REFLUX DISEASE WITH ESOPHAGITIS: ICD-10-CM

## 2018-09-27 DIAGNOSIS — G43.831 MENSTRUAL MIGRAINE, WITH INTRACTABLE MIGRAINE, SO STATED, WITH STATUS MIGRAINOSUS: ICD-10-CM

## 2018-09-27 NOTE — TELEPHONE ENCOUNTER
Rec'd incoming fax from [facility] dated 9/25/18 indicating that the SRF was received and that it was COMPLETE. No additional paperwork required at this time.

## 2018-09-27 NOTE — TELEPHONE ENCOUNTER
Medication: DICLOFENAC SODIUM 50 MG     Date of last refill: 07/3149305 (#60/1)  [place]     Last office visit: 9/6/2018  Due back to clinic per last office note:  6 months  Date next office visit scheduled:    Future Ap

## 2018-11-07 PROCEDURE — 88175 CYTOPATH C/V AUTO FLUID REDO: CPT | Performed by: OBSTETRICS & GYNECOLOGY

## 2018-11-07 PROCEDURE — 87624 HPV HI-RISK TYP POOLED RSLT: CPT | Performed by: OBSTETRICS & GYNECOLOGY

## 2018-11-08 PROBLEM — K59.09 CHRONIC CONSTIPATION: Status: ACTIVE | Noted: 2018-11-08

## 2018-12-13 DIAGNOSIS — R20.2 TINGLING IN EXTREMITIES: Primary | ICD-10-CM

## 2018-12-13 NOTE — TELEPHONE ENCOUNTER
Medication: DICLOFENAC SODIUM 50 MG Oral Tab EC    Date of last refill: 09/27/18 (#60/1)  [place]    Last office visit: 9/6/2018  Due back to clinic per last office note:  Around 03/06/19  Date next office visit sche

## 2018-12-17 ENCOUNTER — TELEPHONE (OUTPATIENT)
Dept: NEUROLOGY | Facility: CLINIC | Age: 58
End: 2018-12-17

## 2018-12-17 DIAGNOSIS — G43.001 MIGRAINE WITHOUT AURA AND WITH STATUS MIGRAINOSUS, NOT INTRACTABLE: Primary | ICD-10-CM

## 2018-12-17 NOTE — TELEPHONE ENCOUNTER
Received a fax stating patient's PA for [employer] is about to  and request a renewal be submitted for continued therapy. Referral for PA Aimovig renewal initiated.

## 2018-12-18 NOTE — TELEPHONE ENCOUNTER
Patient María Elena Snyder is approved until 12/27/18 have to wait until 1/1/19 to start new PA    Request ID [id number], valid 6/27/18-12/27/18

## 2019-01-03 NOTE — TELEPHONE ENCOUNTER
Per PA dept, Reauthorization questions needed it can be entered in the referral did in the system. [employer] message sent with Reauthorization questions to be answered by patient.

## 2019-01-08 NOTE — TELEPHONE ENCOUNTER
LMTCB. When returns call please have patient answer reauthorization questions below. Patient called back and answered questions. Forwarding to PA for reauthorization.      Reauthorization Questions:   Has the patient experienced a reduction of at least

## 2019-01-17 NOTE — TELEPHONE ENCOUNTER
Spoke with Anne Saldaña at [address] [place] [address] Rd #:ANDREW1/17/2019   Time:12:19    Mentioned to Anne Saldaña that patient kept pulling up as \"error with your request\" thru CoverMyMeds. Per Anne Saldaña authorization for AIMOVIG needs to be done thru [employer].

## 2019-01-17 NOTE — TELEPHONE ENCOUNTER
Spoke with patient, notified current PA is still pending with insurance. Patient states [facility] pharmacy told her she could not use the access card, no reason given.      Contacted [facility] pharmacy, spoke with pharmacist who states she is not sure why but the acce

## 2019-01-17 NOTE — TELEPHONE ENCOUNTER
Pharmacist called back and said that Rx for Ajovy is now going through with no charge. They will fill Rx. Spoke with patient and relayed above.

## 2019-01-28 PROBLEM — F07.81 POST-CONCUSSION SYNDROME: Status: ACTIVE | Noted: 2019-01-28

## 2019-01-28 PROBLEM — S06.0X9A CONCUSSION WITH LOSS OF CONSCIOUSNESS: Status: ACTIVE | Noted: 2019-01-28

## 2019-01-28 NOTE — PROGRESS NOTES
[facility] in [place] with [facility]  Neurology - Clinic Follow up  2019    Lopez Morataya Patient Status:  No patient class for patient encounter    3/26/1960 MRN [id number]   YASMANI WATER   • HYSTERECTOMY  9/24/09    Supracervical hyst & BSO   • SINUS SURGERY       • THYROIDECTOMY     • THYROIDECTOMY N/A 5/16/2017    Performed by Mey Al MD at [facility] MAIN OR   • TONSILLECTOMY     • TOTAL KNEE REPLACEMENT Right 3/11   • Willie Green mg into the skin every 30 (thirty) days. Disp: 1 pen Rfl: 11   B Complex Vitamins (VITAMIN B COMPLEX OR) Take by mouth daily.  Disp:  Rfl:    ondansetron (ZOFRAN ODT) 4 MG Oral Tablet Dispersible Take 1 tablet (4 mg total) by mouth every 8 (eight) hours as tenderness, others see neuro exam;  Extremities:  no pitting edema, no cyanosis or skin rash,  pulse is present,    Neurologic Examination  Mental Status  Is intact for age, and Language is intact, no slurred speech; calm, no acute stress, pleasant,   CN i Tab    Tremor of both hands    Concussion with loss of consciousness    Post-concussion syndrome      Other Visit Diagnoses     Nonintractable migraine, unspecified migraine type        Relevant Medications    topiramate 200 MG Oral Tab    SUMAtriptan Succ Return in about 3 months (around 4/28/2019). If she is doing well with monthly injection, then taper off her Topamax. We discussed in depth regarding the diagnosis, prognosis, treatment,  The patient was given ample opportunity to ask questions.  A

## 2019-01-29 ENCOUNTER — TELEPHONE (OUTPATIENT)
Dept: NEUROLOGY | Facility: CLINIC | Age: 59
End: 2019-01-29

## 2019-01-29 NOTE — TELEPHONE ENCOUNTER
Rx for Butalbital-APAP-Caffeine -40 MG Oral Tab faxed to preferred pharmacy with fax confirmation received.

## 2019-02-24 DIAGNOSIS — R20.2 TINGLING IN EXTREMITIES: ICD-10-CM

## 2019-02-25 NOTE — TELEPHONE ENCOUNTER
Medication: DICLOFENAC SODIUM 50 MG Oral Tab EC    Date of last refill: 12/13/18 (#60/1) discontinued  [place]    Last office visit: 1/28/2019  Due back to clinic per last office note:  Around 04/28/19  Date next off

## 2019-03-08 NOTE — TELEPHONE ENCOUNTER
Received approval from Jayjay W Alvin Zamora from [address] [place] [address] from 1/23/2019 to 7/23/2019.  Request Id [id number] and patient ID [id number]    Called patients pharmacy and informed them of approval.

## 2019-04-12 DIAGNOSIS — R20.2 TINGLING IN EXTREMITIES: ICD-10-CM

## 2019-04-29 ENCOUNTER — OFFICE VISIT (OUTPATIENT)
Dept: NEUROLOGY | Facility: CLINIC | Age: 59
End: 2019-04-29
Payer: COMMERCIAL

## 2019-04-29 VITALS
WEIGHT: 187 LBS | DIASTOLIC BLOOD PRESSURE: 78 MMHG | BODY MASS INDEX: 34 KG/M2 | SYSTOLIC BLOOD PRESSURE: 132 MMHG | HEART RATE: 82 BPM | RESPIRATION RATE: 18 BRPM

## 2019-04-29 DIAGNOSIS — S06.0X9A CONCUSSION WITH LOSS OF CONSCIOUSNESS, INITIAL ENCOUNTER: Primary | ICD-10-CM

## 2019-04-29 DIAGNOSIS — F32.0 CURRENT MILD EPISODE OF MAJOR DEPRESSIVE DISORDER WITHOUT PRIOR EPISODE (HCC): ICD-10-CM

## 2019-04-29 DIAGNOSIS — H81.10 BENIGN PAROXYSMAL VERTIGO, UNSPECIFIED LATERALITY: ICD-10-CM

## 2019-04-29 PROCEDURE — 99214 OFFICE O/P EST MOD 30 MIN: CPT | Performed by: OTHER

## 2019-04-29 RX ORDER — MECLIZINE HYDROCHLORIDE 25 MG/1
25 TABLET ORAL 3 TIMES DAILY PRN
Qty: 30 TABLET | Refills: 3 | Status: SHIPPED | OUTPATIENT
Start: 2019-04-29 | End: 2020-01-15

## 2019-04-29 NOTE — PROGRESS NOTES
[employer] in [place] with [facility]  Neurology - Clinic Follow up  2019    Sandra Taveras Patient Status:  No patient class for patient encounter    3/26/1960 MRN [id number]   TOMMY • TOTAL KNEE REPLACEMENT Right 3/11   • TUBAL LIGATION         Family history:  Non contributive    ALLERGIES:    Seafood                 ANAPHYLAXIS  Demerol                 HIVES, RASH  Dilaudid [[employer]*    HIVES, ITCHING  Morphine Sulfate every 8 (eight) hours as needed for Nausea. Disp: 20 tablet Rfl: 1   Cholecalciferol (VITAMIN D3) 5000 units Oral Cap 10,000 Units once a week. Sundays  Disp:  Rfl:    Calcium Carb-Cholecalciferol (CVS CALCIUM 600 & VITAMIN D3 OR) daily.    Disp:  Rfl:    V is normal from II to XII, visual field is full, EOMI, pupil symmetrical, light reflexes are present, fundus exam is normal. Face symmetrical with normal sensation, hearing normal, shoulder shrugging normal.   Motor:  NO drift.  TARIQ intact, normal tone, norm time,    recurrent BPV after head concussion, Antivert PRN.  positional PT is given,     She has common  Migraine,  aimovig is helping her,   Will continue Topamax  200 mg bid for now, hen may cut down to 200 mg qhs next time, eventually off it, since she d

## 2019-06-12 DIAGNOSIS — R20.2 TINGLING IN EXTREMITIES: ICD-10-CM

## 2019-07-24 DIAGNOSIS — G43.001 MIGRAINE WITHOUT AURA AND WITH STATUS MIGRAINOSUS, NOT INTRACTABLE: ICD-10-CM

## 2019-07-24 NOTE — TELEPHONE ENCOUNTER
Called and spoke with the pharmacist who states that the patient prescription has  and will need a new prescription.      Medication: TOPIRAMATE 200 MG Oral Tab    Date of last refill: 19 (#180/3)  [place]

## 2019-07-29 NOTE — PROGRESS NOTES
[facility] [place] [facility]  [facility]urology - Clinic Follow up  2019    Easter Spatz Patient Status:  No patient class for patient encounter    3/26/1960 MRN [id number]   RUFINA Right 2013    KNEE   •      • COLONOSCOPY      Repeated in , Dr. Norman Arora      PT HAD RHABDOMYOLYSIS AFTER ABLATION, DONE WITH WATER   • HYSTERECTOMY  09    Supracervical hyst & BSO   • Carb-Cholecalciferol (CVS CALCIUM 600 & VITAMIN D3 OR) daily. Disp:  Rfl:    Atorvastatin Calcium 40 MG Oral Tab Take 40 mg by mouth nightly.  Disp:  Rfl:    Meclizine HCl 25 MG Oral Tab Take 1 tablet (25 mg total) by mouth 3 (three) times daily as needed B/L;  Musculoskeletal: no joint tenderness, others see neuro exam;  Extremities:  no pitting edema, no cyanosis or skin rash,  pulse is present,    Neurologic Examination  Mental Status  Is intact for age, and Language is intact, no slurred speech; calm, n diagnosis)    (F32.9,  R53.83) Fatigue due to depression    (G43.001) Migraine without aura and with status migrainosus, not intractable    (M54.2) Neck pain, bilateral    (F07.81) Post-concussion syndrome      Summery:  Migraine  HA flare up last two week

## 2019-07-29 NOTE — PROGRESS NOTES
Pt states that she has had 4 migraines since 17 July 2019 last one day each. Pt states the pain originates in the neck which then radiates forward.

## 2019-07-29 NOTE — PROGRESS NOTES
Consent signed prior to Toradol/Decadron injection. Patient has had both injections in past, states no issue with either. Patient denies allergy to NSAIDs/steroids. Patient tolerated injections with only complaint of soreness at site.

## 2019-08-02 PROBLEM — S06.0X9A CONCUSSION WITH LOSS OF CONSCIOUSNESS: Status: RESOLVED | Noted: 2019-01-28 | Resolved: 2019-08-02

## 2019-08-02 PROBLEM — R20.2 TINGLING IN EXTREMITIES: Status: RESOLVED | Noted: 2018-01-05 | Resolved: 2019-08-02

## 2019-08-22 ENCOUNTER — HOSPITAL ENCOUNTER (OUTPATIENT)
Dept: MRI IMAGING | Facility: HOSPITAL | Age: 59
Discharge: HOME | End: 2019-08-22
Attending: Other
Payer: COMMERCIAL

## 2019-08-22 DIAGNOSIS — M54.2 NECK PAIN, BILATERAL: ICD-10-CM

## 2019-08-22 PROCEDURE — 72141 MRI NECK SPINE W/O DYE: CPT | Performed by: OTHER

## 2019-08-23 ENCOUNTER — TELEPHONE (OUTPATIENT)
Dept: NEUROLOGY | Facility: CLINIC | Age: 59
End: 2019-08-23

## 2019-08-23 NOTE — TELEPHONE ENCOUNTER
Per Epic review, patient is active on MyChart. Message sent with the below results noted.     ----- Message from Adore Salvador MD sent at 8/23/2019  1:09 PM CDT -----  MRI showed mild DJD in cervical spine, will review film at visit

## 2019-09-23 PROCEDURE — 88185 FLOWCYTOMETRY/TC ADD-ON: CPT | Performed by: INTERNAL MEDICINE

## 2019-09-23 PROCEDURE — 88184 FLOWCYTOMETRY/ TC 1 MARKER: CPT | Performed by: INTERNAL MEDICINE

## 2019-11-16 DIAGNOSIS — G43.001 MIGRAINE WITHOUT AURA AND WITH STATUS MIGRAINOSUS, NOT INTRACTABLE: ICD-10-CM

## 2019-11-18 NOTE — TELEPHONE ENCOUNTER
Medication: TOPIRAMATE 200 MG Oral Tab    Date of last refill: 07/24/19 (#180/0)  [place]    Last office visit: 7/29/2019  Due back to clinic per last office note:  Around 10/29/19  Date next office visit scheduled:

## 2020-01-15 DIAGNOSIS — S06.0X9A CONCUSSION WITH LOSS OF CONSCIOUSNESS, INITIAL ENCOUNTER: Primary | ICD-10-CM

## 2020-01-15 NOTE — TELEPHONE ENCOUNTER
Medication: MECLIZINE HCL 25 MG Oral Tab    Date of last refill: 04/29/19 (#30/3)  [place]    Last office visit: 7/29/2019  Due back to clinic per last office note:  Around 10/29/19  Date next office visit scheduled:

## 2020-01-16 RX ORDER — MECLIZINE HYDROCHLORIDE 25 MG/1
TABLET ORAL
Qty: 30 TABLET | Refills: 2 | Status: SHIPPED | OUTPATIENT
Start: 2020-01-16 | End: 2020-08-31

## 2020-03-10 DIAGNOSIS — F32.89 OTHER DEPRESSION: ICD-10-CM

## 2020-03-10 NOTE — TELEPHONE ENCOUNTER
Sent the patient a [employer] message to make an appt for further medication refills.      Medication: MIRTAZAPINE 45 MG Oral Tab    Date of last refill: 01/28/19 (#90/3)  [place]    Last office visit: 07/29/19  Due back

## 2020-03-11 RX ORDER — MIRTAZAPINE 45 MG/1
TABLET, FILM COATED ORAL
Qty: 90 TABLET | Refills: 0 | Status: SHIPPED | OUTPATIENT
Start: 2020-03-11 | End: 2020-06-05

## 2020-05-19 ENCOUNTER — TELEPHONE (OUTPATIENT)
Dept: NEUROLOGY | Facility: CLINIC | Age: 60
End: 2020-05-19

## 2020-05-19 DIAGNOSIS — G43.001 MIGRAINE WITHOUT AURA AND WITH STATUS MIGRAINOSUS, NOT INTRACTABLE: Primary | ICD-10-CM

## 2020-05-21 ENCOUNTER — TELEPHONE (OUTPATIENT)
Dept: NEUROLOGY | Facility: CLINIC | Age: 60
End: 2020-05-21

## 2020-05-21 NOTE — TELEPHONE ENCOUNTER
Last PA states only approved through 7/23/19. Received call from pt that PA needs done. Reauth questions sent on [employer]

## 2020-05-21 NOTE — TELEPHONE ENCOUNTER
CIRA other placed with reauth questions. Reauthorization Questions for Aimovig 70m. Has the patient experienced a reduction of at least 7 headache days or 100 hours per month since starting medication?    Yes      2. If yes, by what percentage?

## 2020-05-27 NOTE — TELEPHONE ENCOUNTER
Contacted pharmacy to inform them of approval.  It is ready for patient pick  Up.   Sent pt a Rapid [employer] Screening message indicating

## 2020-06-01 DIAGNOSIS — F32.89 OTHER DEPRESSION: ICD-10-CM

## 2020-06-02 NOTE — TELEPHONE ENCOUNTER
Pt needs appt.  Sent [employer] message    Medication: MIRTAZAPINE 45 MG     Date of last refill: 3/11/20 (#90/0)  [place]    Last office visit: 7/29/19  Due back to clinic per last office note:  3 months  Date next offic

## 2020-06-05 PROBLEM — M25.50 JOINT PAIN: Status: RESOLVED | Noted: 2018-03-08 | Resolved: 2020-06-05

## 2020-07-31 PROBLEM — R60.0 ARM EDEMA: Status: RESOLVED | Noted: 2017-02-07 | Resolved: 2020-07-31

## 2020-07-31 PROBLEM — E78.5 DYSLIPIDEMIA: Status: RESOLVED | Noted: 2017-02-02 | Resolved: 2020-07-31

## 2020-07-31 PROBLEM — M54.2 NECK PAIN, BILATERAL: Status: RESOLVED | Noted: 2018-03-08 | Resolved: 2020-07-31

## 2020-07-31 PROBLEM — R53.82 CHRONIC FATIGUE: Status: RESOLVED | Noted: 2017-02-07 | Resolved: 2020-07-31

## 2020-08-03 DIAGNOSIS — G43.011 INTRACTABLE MIGRAINE WITHOUT AURA AND WITH STATUS MIGRAINOSUS: ICD-10-CM

## 2020-08-04 NOTE — TELEPHONE ENCOUNTER
LMTCB and schedule an appt for further medication refills.        Medication: AIMOVIG 70 MG/ML Subcutaneous    Date of last refill: 07/29/19 (#1 pen/11)  [place]    Last office visit: 07/29/19  Due back to clinic per

## 2020-08-06 RX ORDER — MIRTAZAPINE 45 MG/1
TABLET, FILM COATED ORAL
Qty: 90 TABLET | Refills: 0 | OUTPATIENT
Start: 2020-08-06

## 2020-08-06 RX ORDER — ERENUMAB-AOOE 70 MG/ML
INJECTION SUBCUTANEOUS
Qty: 1 PEN | Refills: 0 | Status: SHIPPED | OUTPATIENT
Start: 2020-08-06 | End: 2020-08-31

## 2020-08-31 ENCOUNTER — TELEPHONE (OUTPATIENT)
Dept: NEUROLOGY | Facility: CLINIC | Age: 60
End: 2020-08-31

## 2020-08-31 DIAGNOSIS — G43.011 INTRACTABLE MIGRAINE WITHOUT AURA AND WITH STATUS MIGRAINOSUS: ICD-10-CM

## 2020-08-31 RX ORDER — ERENUMAB-AOOE 70 MG/ML
INJECTION SUBCUTANEOUS
Qty: 1 PEN | Refills: 0 | OUTPATIENT
Start: 2020-08-31

## 2020-08-31 NOTE — PROGRESS NOTES
[place] [facility] [place] [facility]  Neurology - Clinic Follow up  2020    Gautam Pace Patient Status:  No patient class for patient encounter    3/26/1960 MRN [id number]   ZYCHRISTIAN N/A 5/16/2017    Performed by Mateo Alston MD at [facility] MAIN OR   • TONSILLECTOMY     • TOTAL KNEE REPLACEMENT Right 3/11   • TUBAL LIGATION         Family history:  Non contributive    ALLERGIES:    Seafood                 ANAPHYLAXIS  Demerol week. Sundays      • Calcium Carb-Cholecalciferol (CVS CALCIUM 600 & VITAMIN D3 OR) daily. REVIEW OF SYSTEMS:    General: denies any fever or chills. Eye: no pain or redness;  Ear, , no hearing change, no throat pain or soreness;   Respirato COORDINATION: Normal FTN and HTS tests,   GAIT:  Normal gait, can do  tandem walk, romberg test is negative,        EMG:    IMPRESSION:  This is an abnormal study.   The findings are highly suggestive of chronic mild bilateral lower cervical and lower lum MG/ML SC      Summery:  Migraine  BUNCH   Will continue  aimovig is helping her before, will keep it,     Will stop Topamax    Will continue  injection aimovig monthly , side effect was discussed, constipation and injection site burning.         tingling in [place]

## 2020-09-21 ENCOUNTER — TELEPHONE (OUTPATIENT)
Dept: NEUROLOGY | Facility: CLINIC | Age: 60
End: 2020-09-21

## 2020-09-21 NOTE — TELEPHONE ENCOUNTER
Unique with [facility], psychiatric NP, would like to put pt on Amatriptyline; Moshe Foley would like to know if pt has ever taken this med and if  is ok with pt taking this med; pls call Moshe Foley at [phone]

## 2020-09-22 NOTE — TELEPHONE ENCOUNTER
Duglas Love, patient's psychiatrist PA notified Dr Lilliam Giles ok'd Amitriptyline use for patient.

## 2020-10-25 DIAGNOSIS — G43.001 MIGRAINE WITHOUT AURA AND WITH STATUS MIGRAINOSUS, NOT INTRACTABLE: ICD-10-CM

## 2020-10-26 NOTE — TELEPHONE ENCOUNTER
Per doctors notes patient is not supposed to take topiramate. Per te 9/21/20 pt is supposed to take Amitriptyline.     Medication: TOPIRAMATE 200 MG Oral Tab    Date of last refill: 11/19/19 (#60/2)-discon  [place]

## 2020-11-24 ENCOUNTER — APPOINTMENT (OUTPATIENT)
Dept: PREADMISSION TESTING | Age: 60
End: 2020-11-24
Attending: ORTHOPAEDIC SURGERY

## 2020-11-27 NOTE — TELEPHONE ENCOUNTER
Medication: CYCLOBENZAPRINE 10 MG Oral Tab    Date of last refill: 08/31/2020 (#30/3)  [place]    Last office visit: 08/31/2020  Due back to clinic per last office note:  Around 08/31/2021  Date next office visit carmel

## 2020-11-28 RX ORDER — METOCLOPRAMIDE 10 MG/1
10 TABLET ORAL ONCE
Status: CANCELLED | OUTPATIENT
Start: 2020-11-28 | End: 2020-11-28

## 2020-11-29 ENCOUNTER — APPOINTMENT (OUTPATIENT)
Dept: LAB | Age: 60
End: 2020-11-29

## 2020-11-30 ENCOUNTER — APPOINTMENT (OUTPATIENT)
Dept: LAB | Age: 60
End: 2020-11-30
Attending: ORTHOPAEDIC SURGERY
Payer: COMMERCIAL

## 2020-11-30 ENCOUNTER — LAB ENCOUNTER (OUTPATIENT)
Dept: LAB | Age: 60
End: 2020-11-30
Attending: ORTHOPAEDIC SURGERY
Payer: COMMERCIAL

## 2020-11-30 DIAGNOSIS — Z01.818 PREOP TESTING: ICD-10-CM

## 2020-11-30 PROCEDURE — 87641 MR-STAPH DNA AMP PROBE: CPT

## 2020-11-30 PROCEDURE — 86900 BLOOD TYPING SEROLOGIC ABO: CPT

## 2020-11-30 PROCEDURE — 86901 BLOOD TYPING SEROLOGIC RH(D): CPT

## 2020-11-30 PROCEDURE — 86850 RBC ANTIBODY SCREEN: CPT

## 2020-12-02 NOTE — H&P
[facility]    PATIENT'S NAME: Jennifer Sebastian WHITE   ATTENDING PHYSICIAN: Britni Bass.  Amanda Mason MD   PATIENT ACCOUNT#:   [id number]    LOCATION:  [facility]  MEDICAL RECORD #:   [id number]       YOB: 1960  ADMISSION DATE:       12/03/20

## 2020-12-03 ENCOUNTER — HOSPITAL ENCOUNTER (INPATIENT)
Facility: HOSPITAL | Age: 60
LOS: 2 days | Discharge: HOME | DRG: 467 | End: 2020-12-05
Attending: ORTHOPAEDIC SURGERY | Admitting: ORTHOPAEDIC SURGERY
Payer: COMMERCIAL

## 2020-12-03 ENCOUNTER — ANESTHESIA EVENT (OUTPATIENT)
Dept: SURGERY | Facility: HOSPITAL | Age: 60
DRG: 467 | End: 2020-12-03
Payer: COMMERCIAL

## 2020-12-03 ENCOUNTER — ANESTHESIA (OUTPATIENT)
Dept: SURGERY | Facility: HOSPITAL | Age: 60
DRG: 467 | End: 2020-12-03
Payer: COMMERCIAL

## 2020-12-03 DIAGNOSIS — Z01.818 PREOP TESTING: Primary | ICD-10-CM

## 2020-12-03 DIAGNOSIS — Z96.651 PRESENCE OF RIGHT ARTIFICIAL KNEE JOINT: ICD-10-CM

## 2020-12-03 PROCEDURE — 88305 TISSUE EXAM BY PATHOLOGIST: CPT | Performed by: ORTHOPAEDIC SURGERY

## 2020-12-03 PROCEDURE — 0SPC0JZ REMOVAL OF SYNTHETIC SUBSTITUTE FROM RIGHT KNEE JOINT, OPEN APPROACH: ICD-10-PCS | Performed by: ORTHOPAEDIC SURGERY

## 2020-12-03 PROCEDURE — 87176 TISSUE HOMOGENIZATION CULTR: CPT | Performed by: ORTHOPAEDIC SURGERY

## 2020-12-03 PROCEDURE — 76942 ECHO GUIDE FOR BIOPSY: CPT | Performed by: ORTHOPAEDIC SURGERY

## 2020-12-03 PROCEDURE — 3E0T3BZ INTRODUCTION OF ANESTHETIC AGENT INTO PERIPHERAL NERVES AND PLEXI, PERCUTANEOUS APPROACH: ICD-10-PCS | Performed by: ANESTHESIOLOGY

## 2020-12-03 PROCEDURE — 0SRC0J9 REPLACEMENT OF RIGHT KNEE JOINT WITH SYNTHETIC SUBSTITUTE, CEMENTED, OPEN APPROACH: ICD-10-PCS | Performed by: ORTHOPAEDIC SURGERY

## 2020-12-03 PROCEDURE — 87205 SMEAR GRAM STAIN: CPT | Performed by: ORTHOPAEDIC SURGERY

## 2020-12-03 PROCEDURE — 64447 NJX AA&/STRD FEMORAL NRV IMG: CPT | Performed by: ORTHOPAEDIC SURGERY

## 2020-12-03 PROCEDURE — 88300 SURGICAL PATH GROSS: CPT | Performed by: ORTHOPAEDIC SURGERY

## 2020-12-03 PROCEDURE — 87070 CULTURE OTHR SPECIMN AEROBIC: CPT | Performed by: ORTHOPAEDIC SURGERY

## 2020-12-03 PROCEDURE — 87075 CULTR BACTERIA EXCEPT BLOOD: CPT | Performed by: ORTHOPAEDIC SURGERY

## 2020-12-03 DEVICE — REFOBACIN BC R 1X40 US: Type: IMPLANTABLE DEVICE | Site: KNEE | Status: FUNCTIONAL

## 2020-12-03 RX ORDER — FAMOTIDINE 20 MG/1
20 TABLET ORAL ONCE
Status: COMPLETED | OUTPATIENT
Start: 2020-12-03 | End: 2020-12-03

## 2020-12-03 RX ORDER — LIDOCAINE HYDROCHLORIDE 10 MG/ML
INJECTION, SOLUTION EPIDURAL; INFILTRATION; INTRACAUDAL; PERINEURAL AS NEEDED
Status: DISCONTINUED | OUTPATIENT
Start: 2020-12-03 | End: 2020-12-03 | Stop reason: SURG

## 2020-12-03 RX ORDER — MORPHINE SULFATE 10 MG/ML
6 INJECTION, SOLUTION INTRAMUSCULAR; INTRAVENOUS EVERY 10 MIN PRN
Status: DISCONTINUED | OUTPATIENT
Start: 2020-12-03 | End: 2020-12-03 | Stop reason: HOSPADM

## 2020-12-03 RX ORDER — VENLAFAXINE HYDROCHLORIDE 150 MG/1
300 CAPSULE, EXTENDED RELEASE ORAL DAILY
Status: DISCONTINUED | OUTPATIENT
Start: 2020-12-04 | End: 2020-12-05

## 2020-12-03 RX ORDER — SODIUM CHLORIDE, SODIUM LACTATE, POTASSIUM CHLORIDE, CALCIUM CHLORIDE 600; 310; 30; 20 MG/100ML; MG/100ML; MG/100ML; MG/100ML
INJECTION, SOLUTION INTRAVENOUS CONTINUOUS
Status: DISCONTINUED | OUTPATIENT
Start: 2020-12-03 | End: 2020-12-05

## 2020-12-03 RX ORDER — MONTELUKAST SODIUM 10 MG/1
10 TABLET ORAL NIGHTLY
Status: DISCONTINUED | OUTPATIENT
Start: 2020-12-03 | End: 2020-12-05

## 2020-12-03 RX ORDER — HYDROCODONE BITARTRATE AND ACETAMINOPHEN 7.5; 325 MG/1; MG/1
2 TABLET ORAL EVERY 4 HOURS PRN
Status: DISCONTINUED | OUTPATIENT
Start: 2020-12-03 | End: 2020-12-05

## 2020-12-03 RX ORDER — HYDROCODONE BITARTRATE AND ACETAMINOPHEN 5; 325 MG/1; MG/1
1 TABLET ORAL AS NEEDED
Status: DISCONTINUED | OUTPATIENT
Start: 2020-12-03 | End: 2020-12-03 | Stop reason: HOSPADM

## 2020-12-03 RX ORDER — HALOPERIDOL 5 MG/ML
0.25 INJECTION INTRAMUSCULAR ONCE AS NEEDED
Status: DISCONTINUED | OUTPATIENT
Start: 2020-12-03 | End: 2020-12-03 | Stop reason: HOSPADM

## 2020-12-03 RX ORDER — SUMATRIPTAN 50 MG/1
100 TABLET, FILM COATED ORAL AS NEEDED
Status: DISCONTINUED | OUTPATIENT
Start: 2020-12-03 | End: 2020-12-05

## 2020-12-03 RX ORDER — PROCHLORPERAZINE EDISYLATE 5 MG/ML
5 INJECTION INTRAMUSCULAR; INTRAVENOUS ONCE AS NEEDED
Status: DISCONTINUED | OUTPATIENT
Start: 2020-12-03 | End: 2020-12-03 | Stop reason: HOSPADM

## 2020-12-03 RX ORDER — DIPHENHYDRAMINE HCL 25 MG
25 CAPSULE ORAL EVERY 4 HOURS PRN
Status: DISCONTINUED | OUTPATIENT
Start: 2020-12-03 | End: 2020-12-05

## 2020-12-03 RX ORDER — BUPIVACAINE HYDROCHLORIDE 2.5 MG/ML
INJECTION, SOLUTION EPIDURAL; INFILTRATION; INTRACAUDAL AS NEEDED
Status: DISCONTINUED | OUTPATIENT
Start: 2020-12-03 | End: 2020-12-03 | Stop reason: HOSPADM

## 2020-12-03 RX ORDER — CEFAZOLIN SODIUM/WATER 2 G/20 ML
2 SYRINGE (ML) INTRAVENOUS ONCE
Status: DISCONTINUED | OUTPATIENT
Start: 2020-12-03 | End: 2020-12-03 | Stop reason: HOSPADM

## 2020-12-03 RX ORDER — DEXAMETHASONE SODIUM PHOSPHATE 4 MG/ML
4 VIAL (ML) INJECTION EVERY 6 HOURS
Status: DISCONTINUED | OUTPATIENT
Start: 2020-12-03 | End: 2020-12-03

## 2020-12-03 RX ORDER — CEFAZOLIN SODIUM/WATER 2 G/20 ML
2 SYRINGE (ML) INTRAVENOUS EVERY 8 HOURS
Status: COMPLETED | OUTPATIENT
Start: 2020-12-04 | End: 2020-12-04

## 2020-12-03 RX ORDER — DIPHENHYDRAMINE HYDROCHLORIDE 50 MG/ML
25 INJECTION INTRAMUSCULAR; INTRAVENOUS ONCE AS NEEDED
Status: ACTIVE | OUTPATIENT
Start: 2020-12-03 | End: 2020-12-03

## 2020-12-03 RX ORDER — ONDANSETRON 2 MG/ML
INJECTION INTRAMUSCULAR; INTRAVENOUS AS NEEDED
Status: DISCONTINUED | OUTPATIENT
Start: 2020-12-03 | End: 2020-12-03 | Stop reason: SURG

## 2020-12-03 RX ORDER — MORPHINE SULFATE 2 MG/ML
2 INJECTION, SOLUTION INTRAMUSCULAR; INTRAVENOUS EVERY 2 HOUR PRN
Status: DISCONTINUED | OUTPATIENT
Start: 2020-12-03 | End: 2020-12-03

## 2020-12-03 RX ORDER — MIDAZOLAM HYDROCHLORIDE 1 MG/ML
INJECTION INTRAMUSCULAR; INTRAVENOUS
Status: COMPLETED | OUTPATIENT
Start: 2020-12-03 | End: 2020-12-03

## 2020-12-03 RX ORDER — DEXAMETHASONE SODIUM PHOSPHATE 4 MG/ML
VIAL (ML) INJECTION AS NEEDED
Status: DISCONTINUED | OUTPATIENT
Start: 2020-12-03 | End: 2020-12-03 | Stop reason: SURG

## 2020-12-03 RX ORDER — BISACODYL 10 MG
10 SUPPOSITORY, RECTAL RECTAL
Status: DISCONTINUED | OUTPATIENT
Start: 2020-12-03 | End: 2020-12-05

## 2020-12-03 RX ORDER — POLYETHYLENE GLYCOL 3350 17 G/17G
17 POWDER, FOR SOLUTION ORAL DAILY PRN
Status: DISCONTINUED | OUTPATIENT
Start: 2020-12-03 | End: 2020-12-05

## 2020-12-03 RX ORDER — CELECOXIB 200 MG/1
200 CAPSULE ORAL DAILY
Status: DISCONTINUED | OUTPATIENT
Start: 2020-12-03 | End: 2020-12-05

## 2020-12-03 RX ORDER — DEXTROSE MONOHYDRATE 25 G/50ML
50 INJECTION, SOLUTION INTRAVENOUS
Status: DISCONTINUED | OUTPATIENT
Start: 2020-12-03 | End: 2020-12-03 | Stop reason: HOSPADM

## 2020-12-03 RX ORDER — ATENOLOL 50 MG/1
25 TABLET ORAL DAILY
Status: DISCONTINUED | OUTPATIENT
Start: 2020-12-04 | End: 2020-12-05

## 2020-12-03 RX ORDER — ONDANSETRON 2 MG/ML
4 INJECTION INTRAMUSCULAR; INTRAVENOUS EVERY 4 HOURS PRN
Status: DISCONTINUED | OUTPATIENT
Start: 2020-12-03 | End: 2020-12-05

## 2020-12-03 RX ORDER — ONDANSETRON 2 MG/ML
4 INJECTION INTRAMUSCULAR; INTRAVENOUS ONCE AS NEEDED
Status: DISCONTINUED | OUTPATIENT
Start: 2020-12-03 | End: 2020-12-03 | Stop reason: HOSPADM

## 2020-12-03 RX ORDER — MORPHINE SULFATE 2 MG/ML
2 INJECTION, SOLUTION INTRAMUSCULAR; INTRAVENOUS EVERY 10 MIN PRN
Status: DISCONTINUED | OUTPATIENT
Start: 2020-12-03 | End: 2020-12-03 | Stop reason: HOSPADM

## 2020-12-03 RX ORDER — SENNOSIDES 8.6 MG
17.2 TABLET ORAL NIGHTLY
Status: DISCONTINUED | OUTPATIENT
Start: 2020-12-03 | End: 2020-12-05

## 2020-12-03 RX ORDER — OXYCODONE HCL 10 MG/1
10 TABLET, FILM COATED, EXTENDED RELEASE ORAL EVERY 12 HOURS
Status: DISCONTINUED | OUTPATIENT
Start: 2020-12-03 | End: 2020-12-05

## 2020-12-03 RX ORDER — HYDROMORPHONE HYDROCHLORIDE 1 MG/ML
0.2 INJECTION, SOLUTION INTRAMUSCULAR; INTRAVENOUS; SUBCUTANEOUS EVERY 5 MIN PRN
Status: DISCONTINUED | OUTPATIENT
Start: 2020-12-03 | End: 2020-12-03 | Stop reason: HOSPADM

## 2020-12-03 RX ORDER — SODIUM PHOSPHATE, DIBASIC AND SODIUM PHOSPHATE, MONOBASIC 7; 19 G/133ML; G/133ML
1 ENEMA RECTAL ONCE AS NEEDED
Status: DISCONTINUED | OUTPATIENT
Start: 2020-12-03 | End: 2020-12-05

## 2020-12-03 RX ORDER — ROPIVACAINE HYDROCHLORIDE 5 MG/ML
INJECTION, SOLUTION EPIDURAL; INFILTRATION; PERINEURAL
Status: COMPLETED | OUTPATIENT
Start: 2020-12-03 | End: 2020-12-03

## 2020-12-03 RX ORDER — MORPHINE SULFATE 4 MG/ML
4 INJECTION, SOLUTION INTRAMUSCULAR; INTRAVENOUS EVERY 2 HOUR PRN
Status: DISCONTINUED | OUTPATIENT
Start: 2020-12-03 | End: 2020-12-03

## 2020-12-03 RX ORDER — NALOXONE HYDROCHLORIDE 0.4 MG/ML
80 INJECTION, SOLUTION INTRAMUSCULAR; INTRAVENOUS; SUBCUTANEOUS AS NEEDED
Status: DISCONTINUED | OUTPATIENT
Start: 2020-12-03 | End: 2020-12-03 | Stop reason: HOSPADM

## 2020-12-03 RX ORDER — ACETAMINOPHEN 500 MG
1000 TABLET ORAL ONCE
Status: COMPLETED | OUTPATIENT
Start: 2020-12-03 | End: 2020-12-03

## 2020-12-03 RX ORDER — OXYCODONE HYDROCHLORIDE 5 MG/1
5 TABLET ORAL EVERY 4 HOURS PRN
Status: ACTIVE | OUTPATIENT
Start: 2020-12-03 | End: 2020-12-04

## 2020-12-03 RX ORDER — LIDOCAINE HYDROCHLORIDE 40 MG/ML
SOLUTION TOPICAL AS NEEDED
Status: DISCONTINUED | OUTPATIENT
Start: 2020-12-03 | End: 2020-12-03 | Stop reason: SURG

## 2020-12-03 RX ORDER — LEVOTHYROXINE SODIUM 0.12 MG/1
125 TABLET ORAL
Status: DISCONTINUED | OUTPATIENT
Start: 2020-12-04 | End: 2020-12-05

## 2020-12-03 RX ORDER — HYDROCODONE BITARTRATE AND ACETAMINOPHEN 5; 325 MG/1; MG/1
2 TABLET ORAL AS NEEDED
Status: DISCONTINUED | OUTPATIENT
Start: 2020-12-03 | End: 2020-12-03 | Stop reason: HOSPADM

## 2020-12-03 RX ORDER — HYDROMORPHONE HYDROCHLORIDE 1 MG/ML
0.4 INJECTION, SOLUTION INTRAMUSCULAR; INTRAVENOUS; SUBCUTANEOUS EVERY 5 MIN PRN
Status: DISCONTINUED | OUTPATIENT
Start: 2020-12-03 | End: 2020-12-03 | Stop reason: HOSPADM

## 2020-12-03 RX ORDER — PHENYLEPHRINE HCL 10 MG/ML
VIAL (ML) INJECTION AS NEEDED
Status: DISCONTINUED | OUTPATIENT
Start: 2020-12-03 | End: 2020-12-03 | Stop reason: SURG

## 2020-12-03 RX ORDER — OXYCODONE HYDROCHLORIDE 5 MG/1
10 TABLET ORAL EVERY 4 HOURS PRN
Status: DISPENSED | OUTPATIENT
Start: 2020-12-03 | End: 2020-12-04

## 2020-12-03 RX ORDER — MORPHINE SULFATE 4 MG/ML
4 INJECTION, SOLUTION INTRAMUSCULAR; INTRAVENOUS EVERY 10 MIN PRN
Status: DISCONTINUED | OUTPATIENT
Start: 2020-12-03 | End: 2020-12-03 | Stop reason: HOSPADM

## 2020-12-03 RX ORDER — HYDROCODONE BITARTRATE AND ACETAMINOPHEN 7.5; 325 MG/1; MG/1
1 TABLET ORAL EVERY 4 HOURS PRN
Status: DISCONTINUED | OUTPATIENT
Start: 2020-12-03 | End: 2020-12-05

## 2020-12-03 RX ORDER — ASPIRIN 81 MG/1
81 TABLET ORAL 2 TIMES DAILY
Status: DISCONTINUED | OUTPATIENT
Start: 2020-12-03 | End: 2020-12-05

## 2020-12-03 RX ORDER — METOPROLOL TARTRATE 5 MG/5ML
2.5 INJECTION INTRAVENOUS ONCE
Status: DISCONTINUED | OUTPATIENT
Start: 2020-12-03 | End: 2020-12-03 | Stop reason: HOSPADM

## 2020-12-03 RX ORDER — MORPHINE SULFATE 4 MG/ML
6 INJECTION, SOLUTION INTRAMUSCULAR; INTRAVENOUS EVERY 2 HOUR PRN
Status: DISCONTINUED | OUTPATIENT
Start: 2020-12-03 | End: 2020-12-03

## 2020-12-03 RX ORDER — AMITRIPTYLINE HYDROCHLORIDE 25 MG/1
25 TABLET, FILM COATED ORAL NIGHTLY
Status: DISCONTINUED | OUTPATIENT
Start: 2020-12-03 | End: 2020-12-05

## 2020-12-03 RX ORDER — CEFAZOLIN SODIUM/WATER 2 G/20 ML
SYRINGE (ML) INTRAVENOUS AS NEEDED
Status: DISCONTINUED | OUTPATIENT
Start: 2020-12-03 | End: 2020-12-03 | Stop reason: SURG

## 2020-12-03 RX ORDER — LIDOCAINE HYDROCHLORIDE 10 MG/ML
INJECTION, SOLUTION INFILTRATION; PERINEURAL
Status: COMPLETED | OUTPATIENT
Start: 2020-12-03 | End: 2020-12-03

## 2020-12-03 RX ORDER — SODIUM CHLORIDE, SODIUM LACTATE, POTASSIUM CHLORIDE, CALCIUM CHLORIDE 600; 310; 30; 20 MG/100ML; MG/100ML; MG/100ML; MG/100ML
INJECTION, SOLUTION INTRAVENOUS CONTINUOUS
Status: DISCONTINUED | OUTPATIENT
Start: 2020-12-03 | End: 2020-12-03 | Stop reason: HOSPADM

## 2020-12-03 RX ORDER — ROSUVASTATIN CALCIUM 5 MG/1
5 TABLET, COATED ORAL NIGHTLY
Status: DISCONTINUED | OUTPATIENT
Start: 2020-12-03 | End: 2020-12-05

## 2020-12-03 RX ORDER — GLYCOPYRROLATE 0.2 MG/ML
INJECTION, SOLUTION INTRAMUSCULAR; INTRAVENOUS AS NEEDED
Status: DISCONTINUED | OUTPATIENT
Start: 2020-12-03 | End: 2020-12-03 | Stop reason: SURG

## 2020-12-03 RX ORDER — CYCLOBENZAPRINE HCL 10 MG
10 TABLET ORAL 3 TIMES DAILY PRN
Status: DISCONTINUED | OUTPATIENT
Start: 2020-12-03 | End: 2020-12-05

## 2020-12-03 RX ORDER — HYDROMORPHONE HYDROCHLORIDE 1 MG/ML
0.6 INJECTION, SOLUTION INTRAMUSCULAR; INTRAVENOUS; SUBCUTANEOUS EVERY 5 MIN PRN
Status: DISCONTINUED | OUTPATIENT
Start: 2020-12-03 | End: 2020-12-03 | Stop reason: HOSPADM

## 2020-12-03 RX ORDER — DIPHENHYDRAMINE HYDROCHLORIDE 50 MG/ML
12.5 INJECTION INTRAMUSCULAR; INTRAVENOUS EVERY 4 HOURS PRN
Status: DISCONTINUED | OUTPATIENT
Start: 2020-12-03 | End: 2020-12-05

## 2020-12-03 RX ORDER — ACETAMINOPHEN 500 MG
1000 TABLET ORAL EVERY 8 HOURS
Status: DISPENSED | OUTPATIENT
Start: 2020-12-03 | End: 2020-12-04

## 2020-12-03 RX ORDER — ACETAMINOPHEN 10 MG/ML
1000 INJECTION, SOLUTION INTRAVENOUS ONCE
Status: COMPLETED | OUTPATIENT
Start: 2020-12-03 | End: 2020-12-03

## 2020-12-03 RX ORDER — DOCUSATE SODIUM 100 MG/1
100 CAPSULE, LIQUID FILLED ORAL 2 TIMES DAILY
Status: DISCONTINUED | OUTPATIENT
Start: 2020-12-03 | End: 2020-12-05

## 2020-12-03 RX ORDER — MORPHINE SULFATE 15 MG/1
15 TABLET ORAL EVERY 4 HOURS PRN
Status: DISCONTINUED | OUTPATIENT
Start: 2020-12-03 | End: 2020-12-03

## 2020-12-03 RX ORDER — ROCURONIUM BROMIDE 10 MG/ML
INJECTION, SOLUTION INTRAVENOUS AS NEEDED
Status: DISCONTINUED | OUTPATIENT
Start: 2020-12-03 | End: 2020-12-03 | Stop reason: SURG

## 2020-12-03 RX ORDER — ACETAMINOPHEN 325 MG/1
650 TABLET ORAL EVERY 4 HOURS PRN
Status: DISCONTINUED | OUTPATIENT
Start: 2020-12-03 | End: 2020-12-05

## 2020-12-03 RX ADMIN — LIDOCAINE HYDROCHLORIDE 50 MG: 10 INJECTION, SOLUTION EPIDURAL; INFILTRATION; INTRACAUDAL; PERINEURAL at 14:18:00

## 2020-12-03 RX ADMIN — MIDAZOLAM HYDROCHLORIDE 2 MG: 1 INJECTION INTRAMUSCULAR; INTRAVENOUS at 12:33:00

## 2020-12-03 RX ADMIN — ONDANSETRON 4 MG: 2 INJECTION INTRAMUSCULAR; INTRAVENOUS at 16:59:00

## 2020-12-03 RX ADMIN — SODIUM CHLORIDE, SODIUM LACTATE, POTASSIUM CHLORIDE, CALCIUM CHLORIDE: 600; 310; 30; 20 INJECTION, SOLUTION INTRAVENOUS at 14:10:00

## 2020-12-03 RX ADMIN — ROCURONIUM BROMIDE 10 MG: 10 INJECTION, SOLUTION INTRAVENOUS at 14:18:00

## 2020-12-03 RX ADMIN — LIDOCAINE HYDROCHLORIDE 5 ML: 10 INJECTION, SOLUTION INFILTRATION; PERINEURAL at 12:33:00

## 2020-12-03 RX ADMIN — ROPIVACAINE HYDROCHLORIDE 30 ML: 5 INJECTION, SOLUTION EPIDURAL; INFILTRATION; PERINEURAL at 12:33:00

## 2020-12-03 RX ADMIN — GLYCOPYRROLATE 0.1 MG: 0.2 INJECTION, SOLUTION INTRAMUSCULAR; INTRAVENOUS at 14:13:00

## 2020-12-03 RX ADMIN — SODIUM CHLORIDE, SODIUM LACTATE, POTASSIUM CHLORIDE, CALCIUM CHLORIDE: 600; 310; 30; 20 INJECTION, SOLUTION INTRAVENOUS at 15:48:00

## 2020-12-03 RX ADMIN — LIDOCAINE HYDROCHLORIDE 4 ML: 40 SOLUTION TOPICAL at 14:21:00

## 2020-12-03 RX ADMIN — CEFAZOLIN SODIUM/WATER 2 G: 2 G/20 ML SYRINGE (ML) INTRAVENOUS at 17:10:00

## 2020-12-03 RX ADMIN — CEFAZOLIN SODIUM/WATER 2 G: 2 G/20 ML SYRINGE (ML) INTRAVENOUS at 14:24:00

## 2020-12-03 RX ADMIN — DEXAMETHASONE SODIUM PHOSPHATE 4 MG: 4 MG/ML VIAL (ML) INJECTION at 14:26:00

## 2020-12-03 RX ADMIN — PHENYLEPHRINE HCL 100 MCG: 10 MG/ML VIAL (ML) INJECTION at 16:08:00

## 2020-12-03 RX ADMIN — PHENYLEPHRINE HCL 100 MCG: 10 MG/ML VIAL (ML) INJECTION at 15:47:00

## 2020-12-03 NOTE — ANESTHESIA PREPROCEDURE EVALUATION
Anesthesia PreOp Note    HPI:     Quita Ocasio is a 62year old female who presents for preoperative consultation requested by: Tasia Soto MD    Date of Surgery: 12/3/2020    Procedure(s):  KNEE TOTAL REVISION  Indication: Presence of right bridger Rhabdomyolysis     after endometrial ablation   • Sleep apnea     no machine   • Vertigo     Occassional, fell on ice 2 yrs ago had concussion   • Visual impairment     READERS       Past Surgical History:   Procedure Laterality Date   • ARTHROSCOPY OF JULIO Rfl: 11, 11/24/2020 at Unknown time    •  Ondansetron HCl (ZOFRAN) 4 mg tablet, TAKE 1 TABLET BY MOUTH EVERY 8 HOURS AS NEEDED FOR NAUSEA, Disp: 20 tablet, Rfl: 2, Past Month at Unknown time    •  B Complex Vitamins (VITAMIN B COMPLEX OR), Take by mouth da Onset   • Cancer Father         Lung   • Hypertension Mother    • Pulmonary Disease Mother         COPD   • Cancer Mother         Cervical   • Hypertension Sister    • Diabetes Sister    • Heart Attack Sister    • Heart Disease Brother    • Genito-Urinary Attends meetings of clubs or organizations: Not on file        Relationship status: Not on file      Intimate partner violence        Fear of current or ex partner: Not on file        Emotionally abused: Not on file        Physically abused: Not on file Evaluation     Patient summary reviewed and Nursing notes reviewed    Airway   Mallampati: II  TM distance: >3 FB  Neck ROM: limited  Dental - normal exam     Pulmonary - normal exam   (+) sleep apnea,   Cardiovascular   (+) hypertension,     Rhythm: regul

## 2020-12-03 NOTE — ANESTHESIA PROCEDURE NOTES
Peripheral Block    Date/Time: 12/3/2020 12:33 PM  Performed by: Hillary Felix MD  Authorized by: Hillary Felix MD       General Information and Staff    Start Time:  12/3/2020 12:32 PM  End Time:  12/3/2020 12:35 PM  Anesthesiologist:  Hillary Felix,

## 2020-12-03 NOTE — ANESTHESIA POSTPROCEDURE EVALUATION
Patient: Juni Greer    Procedure Summary     Date: 12/03/20 Room / Location: [facility] OR 01 / [facility] OR    Anesthesia Start: 5571 Anesthesia Stop: 8317    Procedure: KNEE TOTAL REVISION (Right Knee) Diagnosis:       Presence of right artificial kne

## 2020-12-03 NOTE — ANESTHESIA PROCEDURE NOTES
Airway  Urgency: Elective      General Information and Staff    Patient location during procedure: OR  Anesthesiologist: Koko Harvey MD  Resident/CRNA: Odilia Marie CRNA  Performed: CRNA     Indications and Patient Condition  Indications for airw

## 2020-12-04 PROCEDURE — 85027 COMPLETE CBC AUTOMATED: CPT | Performed by: NURSE PRACTITIONER

## 2020-12-04 PROCEDURE — 97110 THERAPEUTIC EXERCISES: CPT

## 2020-12-04 PROCEDURE — 97530 THERAPEUTIC ACTIVITIES: CPT

## 2020-12-04 PROCEDURE — 97116 GAIT TRAINING THERAPY: CPT

## 2020-12-04 PROCEDURE — 97165 OT EVAL LOW COMPLEX 30 MIN: CPT

## 2020-12-04 PROCEDURE — 94002 VENT MGMT INPAT INIT DAY: CPT

## 2020-12-04 PROCEDURE — 80048 BASIC METABOLIC PNL TOTAL CA: CPT | Performed by: NURSE PRACTITIONER

## 2020-12-04 PROCEDURE — 5A09357 ASSISTANCE WITH RESPIRATORY VENTILATION, LESS THAN 24 CONSECUTIVE HOURS, CONTINUOUS POSITIVE AIRWAY PRESSURE: ICD-10-PCS | Performed by: STUDENT IN AN ORGANIZED HEALTH CARE EDUCATION/TRAINING PROGRAM

## 2020-12-04 PROCEDURE — 97162 PT EVAL MOD COMPLEX 30 MIN: CPT

## 2020-12-04 PROCEDURE — 97535 SELF CARE MNGMENT TRAINING: CPT

## 2020-12-04 NOTE — OPERATIVE REPORT
[facility]    PATIENT'S NAME: Elias Patino CHRISTOPHER   ATTENDING PHYSICIAN: Duglas Wilson. Sly Lucero MD   OPERATING PHYSICIAN: Duglas Wilson.  Sly Lucero MD   PATIENT ACCOUNT#:   [id number]    LOCATION:  [facility] [address] PACU 7 [facility] 10  MEDICAL RECORD #:   [id number] surface cut in the appropriate manner and sized to an E component with appropriate extension stem, 12 mm. Attention was then paid to the distal femur. Appropriate IM reaming was done, and then the femur was sized to a 7 component.   Then multiple augments

## 2020-12-04 NOTE — ANESTHESIA POST-OP FOLLOW-UP NOTE
Acute pain rounds  S/P saphenous block  Good pain control  Side effects:  None  Sensory/motor grossly intact  Will sign off

## 2020-12-04 NOTE — PROGRESS NOTES
[facility] Hospitalist Progress Note     CC: Hospital Follow up    PCP: Scott Bolton MD       Assessment/Plan:     Active Problems:    Preop testing  post op pain control      Patient is a 62year old female with PMH sig for RAMYA, HTN, HLD, Hypothyroidi °C)  Pulse:  [] 99  Resp:  [11-26] 17  BP: ()/(61-91) 111/77      Intake/Output:    Intake/Output Summary (Last 24 hours) at 12/4/2020 1401  Last data filed at 12/4/2020 1051  Gross per 24 hour   Intake 1813 ml   Output 400 ml   Net 1413 ml ringers 20 mL/hr at 12/03/20 1157   • lactated ringers 125 mL/hr at 12/03/20 2220     oxyCODONE HCl **OR** oxyCODONE HCl **OR** [DISCONTINUED] morphINE Sulfate IR, sodium chloride 0.9%, PEG 3350, bisacodyl, Fleet Enema, ondansetron HCl, diphenhydrAMINE **O

## 2020-12-04 NOTE — PHYSICAL THERAPY NOTE
PHYSICAL THERAPY KNEE EVALUATION - INPATIENT       Room Number: 105/766-E  Evaluation Date: 12/4/2020  Type of Evaluation: Initial  Physician Order: PT Eval and Treat    Presenting Problem: s/p revision right TKA  Reason for Therapy: Mobility Dysfunction a mechanics; Endurance; Energy conservation;Patient education; Family education;Gait training;Range of motion;Strengthening;Stair training;Balance training;Transfer training     Frequency (Obs): Daily       PHYSICAL THERAPY MEDICAL/SOCIAL HISTORY     History re ADLs, and driving. Patient lives with family.  Works full time    SUBJECTIVE  Patient agreeable to therapy session     PHYSICAL THERAPY EXAMINATION     OBJECTIVE  Precautions: Limb alert - right  Fall Risk: Standard fall risk    WEIGHT BEARING RESTRICTION assist  Distance (ft): 120  Assistive Device: Rolling walker  Pattern: R Decreased stance time;R Steppage;L Steppage  Stoop/Curb Assistance: Not tested       Bed Mobility: NT    Transfers: SBA    Exercise/Education Provided:  Bed mobility  Body mechanics

## 2020-12-04 NOTE — CM/SW NOTE
CM received MDO for dc order for [facility]. CM met with pt at bedside. Proper PPE warn. CM verified address and telephone number. Pt states she lives at home with her  and son in a 2 story house. 2 steps to enter and 12 up to bedrooms.   Pt is independe

## 2020-12-04 NOTE — PLAN OF CARE
Problem: Patient Centered Care  Goal: Patient preferences are identified and integrated in the patient's plan of care  Description: Interventions:  - What would you like us to know as we care for you?   - Provide timely, complete, and accurate informatio stated pain goal  Description: INTERVENTIONS:  - Encourage pt to monitor pain and request assistance  - Assess pain using appropriate pain scale  - Administer analgesics based on type and severity of pain and evaluate response  - Implement non-pharmacologi

## 2020-12-04 NOTE — PLAN OF CARE
Jeannie Pierce is  alert and orientated. She is using RW and mimimum assistance. Her pain is controlled with Oxy Ir as needed and scheduled Tylenol and ice gel packs. Her discharge plan is home with home health pending passing PT and pain remains controlled.   Vitor precautions as appropriate  - Initiate Pressure Ulcer prevention bundle as indicated  Outcome: Progressing     Problem: PAIN - ADULT  Goal: Verbalizes/displays adequate comfort level or patient's stated pain goal  Description: INTERVENTIONS:  - Encourage p

## 2020-12-04 NOTE — H&P
[facility] Hospitalist H&P       CC: knee pain     PCP: Paul Verde MD    History of Present Illness: Patient is a 62year old female with PMH sig for RAMYA, HTN, HLD, Hypothyroidism, depression, Migraines, IBS, vertigo, who presents for right total k ITCHING     Home Medications:    •  CYCLOBENZAPRINE 10 MG Oral Tab, TAKE 1 TABLET BY MOUTH THREE TIMES A DAY AS NEEDED FOR MUSCLE SPASMS, Disp: 90 tablet, Rfl: 2    •  DIAZEPAM 2 MG Oral Tab, TAKE 1 TABLET BY MOUTH EVERY EVENING AS NEEDED FOR ANXIETY, Disp Never      Comment: rarely       Fam Hx  Family History   Problem Relation Age of Onset   • Cancer Father         Lung   • Hypertension Mother    • Pulmonary Disease Mother         COPD   • Cancer Mother         Cervical   • Hypertension Sister    • Diabet Radiology: No results found. ASSESSMENT / PLAN:   Patient is a 62year old female with PMH sig for RAMYA, HTN, HLD, Hypothyroidism, depression, Migraines, IBS, vertigo, who presents for right total knee revision.      Right total knee revision  - oral

## 2020-12-04 NOTE — PHYSICAL THERAPY NOTE
PHYSICAL THERAPY TREATMENT NOTE - INPATIENT     Room Number: 109/897-S       Presenting Problem: s/p revision right TKA    Problem List  Active Problems:    Preop testing      PHYSICAL THERAPY ASSESSMENT     Patient in bed agree for therapy activity and RN blankets)?: None   -   Sitting down on and standing up from a chair with arms (e.g., wheelchair, bedside commode, etc.): None   -   Moving from lying on back to sitting on the side of the bed?: None   How much help from another person does the patient curr Goal #6 Patient independently performs home exercise program for ROM/strengthening per the instructions provided in preparation for discharge.    Goal #6  Current Status In progress

## 2020-12-04 NOTE — OCCUPATIONAL THERAPY NOTE
OCCUPATIONAL THERAPY EVALUATION - INPATIENT     Room Number: 167/969-J  Evaluation Date: 12/4/2020  Type of Evaluation: Quick Eval  Presenting Problem: Revision R TKA    Physician Order: IP Consult to Occupational Therapy  Reason for Therapy: ADL/IADL Dy ENDOMETRIAL ABLATION      PT HAD RHABDOMYOLYSIS AFTER ABLATION, DONE WITH WATER   • HYSTERECTOMY  9/24/09    Supracervical hyst & BSO   • SINUS SURGERY       • THYROIDECTOMY     • THYROIDECTOMY N/A 5/16/2017    Performed by Juan Pablo Elliott MD at [address] [place] [address] rinsing, drying)?: None  -   Toileting, which includes using toilet, bedpan or urinal? : None  -   Putting on and taking off regular upper body clothing?: None  -   Taking care of personal grooming such as brushing teeth?: None  -   Eating meals?: None ADLS  safely and independently     PPE worn includes gloves, surgical mask and goggles. Patient wore surgical mask during session.

## 2020-12-05 VITALS
BODY MASS INDEX: 32.2 KG/M2 | SYSTOLIC BLOOD PRESSURE: 127 MMHG | HEIGHT: 62 IN | RESPIRATION RATE: 16 BRPM | TEMPERATURE: 99 F | DIASTOLIC BLOOD PRESSURE: 56 MMHG | HEART RATE: 91 BPM | WEIGHT: 175 LBS | OXYGEN SATURATION: 100 %

## 2020-12-05 PROCEDURE — 80048 BASIC METABOLIC PNL TOTAL CA: CPT | Performed by: STUDENT IN AN ORGANIZED HEALTH CARE EDUCATION/TRAINING PROGRAM

## 2020-12-05 PROCEDURE — 97530 THERAPEUTIC ACTIVITIES: CPT

## 2020-12-05 PROCEDURE — 97110 THERAPEUTIC EXERCISES: CPT

## 2020-12-05 PROCEDURE — 97116 GAIT TRAINING THERAPY: CPT

## 2020-12-05 RX ORDER — OXYCODONE HYDROCHLORIDE AND ACETAMINOPHEN 5; 325 MG/1; MG/1
1 TABLET ORAL EVERY 4 HOURS PRN
Status: DISCONTINUED | OUTPATIENT
Start: 2020-12-05 | End: 2020-12-05

## 2020-12-05 RX ORDER — PSEUDOEPHEDRINE HCL 30 MG
100 TABLET ORAL 2 TIMES DAILY
Refills: 0 | Status: SHIPPED | COMMUNITY
Start: 2020-12-05 | End: 2021-01-07

## 2020-12-05 RX ORDER — BISACODYL 10 MG
10 SUPPOSITORY, RECTAL RECTAL
Refills: 0 | Status: SHIPPED | COMMUNITY
Start: 2020-12-05 | End: 2021-01-07

## 2020-12-05 NOTE — PHYSICAL THERAPY NOTE
PHYSICAL THERAPY TREATMENT NOTE - INPATIENT     Room Number: 119/661-H       Presenting Problem: s/p revision right TKA    Problem List  Active Problems:    Preop testing      PHYSICAL THERAPY ASSESSMENT     Patient in bed agree for therapy activity and RN blankets)?: None   -   Sitting down on and standing up from a chair with arms (e.g., wheelchair, bedside commode, etc.): None   -   Moving from lying on back to sitting on the side of the bed?: None   How much help from another person does the patient curr performs home exercise program for ROM/strengthening per the instructions provided in preparation for discharge.    Goal #6  Current Status In progress

## 2020-12-05 NOTE — PROGRESS NOTES
[facility]  Progress Note    T[place]re Mills Patient Status:  Inpatient    3/26/1960 MRN [id number]   Location [facility] 4W/SW/SE Attending Eufemia Douglas MD   Hosp Day # 2 PCP Eufemia Romero MD     SUBJECTIVE:  Interval Hist

## 2020-12-06 NOTE — DISCHARGE SUMMARY
General Medicine Discharge Summary     Patient ID:  Gautam Pace  62year old  3/26/1960    Admit date: 12/3/2020    Discharge date and time: 12/5/2020  1:00 PM     Attending Physician: Dr. Annemarie Fabry MA Changes:     Med list     Medication List      START taking these medications    bisacodyl 10 MG Supp  Commonly known as: DULCOLAX     docusate sodium 100 MG Caps  Commonly known as: COLACE        CONTINUE taking these medications    Amitriptyline HCl 25 M Internal Medicine  Why: Follow up kidney function lab work  Contact information:  Theresa Stock  [address] [id number] [address]  instructions:       Other Discharge Instructions:       Please follow up with your primary care doct

## 2020-12-07 NOTE — PAYOR COMM NOTE
--------------  ADMISSION REVIEW     Payor: Jayjay Cheatham  #:  [id number]  Authorization Number: [id number]    Admit date: 12/3/20  Admit time: 36       Admitting Physician: Frantz Patrick MD  Attending Physician:  No att. providers PT HAD RHABDOMYOLYSIS AFTER ABLATION, DONE WITH WATER   • HYSTERECTOMY  9/24/09    Supracervical hyst & BSO   • SINUS SURGERY       • THYROIDECTOMY     • THYROIDECTOMY N/A 5/16/2017    Performed by Santiago Landeros MD at [place] [address] OR), Take by mouth daily. , Disp: , Rfl:     •  Cholecalciferol (VITAMIN D3) 5000 units Oral Cap, 10,000 Units once a week. Sundays , Disp: , Rfl:     •  Calcium Carb-Cholecalciferol (CVS CALCIUM 600 & VITAMIN D3 OR), daily.   , Disp: , Rfl:           Soc Hx normal. No masses,  No organomegaly. Non distended   Extremities: Knee dressing in place   Skin: Skin color, texture, turgor normal. No rashes or lesions.     Neurologic: Normal strength, no focal deficit appreciated     Diagnostic Data:    CBC/Chem  No res [id number]  Authorization Number: [id number]    Admit date: 12/3/20  Admit time:  2311  Discharge Date: 12/5/2020  1:00 PM     Admitting Physician: Eufemia Douglas MD  Attending Physician:  No att. providers found  Primary Care Physician: Shivani Frank acute blood loss anemia, cbc in am  - asa BID and SCD for DVT prophy  - further management per surgery     RUDOLPH  - Cr elevated but improving since admission  - likely pre-renal with post op setting  - encourage hydration  - monitor bmp, adequate urine outpu 10 MG Tabs  Commonly known as: FLEXERIL     EPINEPHrine 0.3 MG/0.3ML Soaj     Ondansetron HCl 4 mg tablet  Commonly known as: ZOFRAN     oxyCODONE-acetaminophen 5-325 MG Tabs  Commonly known as: PERCOCET            FU  Follow-up Information     Agnes Wilkins

## 2020-12-30 DIAGNOSIS — G43.001 MIGRAINE WITHOUT AURA AND WITH STATUS MIGRAINOSUS, NOT INTRACTABLE: Primary | ICD-10-CM

## 2020-12-30 NOTE — TELEPHONE ENCOUNTER
Medication: Venlafaxine HCl  MG Oral Capsule SR 24 Hr    Date of last refill: 08/31/2020 (#60/5)  [place]    Last office visit: 08/31/2020  Due back to clinic per last office note:  Around 08/31/2021  Date next

## 2020-12-31 RX ORDER — VENLAFAXINE HYDROCHLORIDE 150 MG/1
300 CAPSULE, EXTENDED RELEASE ORAL DAILY
Qty: 60 CAPSULE | Refills: 5 | Status: SHIPPED | OUTPATIENT
Start: 2020-12-31 | End: 2021-08-31

## 2021-05-16 PROBLEM — Z96.651 STATUS POST TOTAL RIGHT KNEE REPLACEMENT: Status: ACTIVE | Noted: 2021-05-16

## 2021-05-18 PROBLEM — M79.18 MYOFASCIAL PAIN SYNDROME: Status: ACTIVE | Noted: 2021-05-18

## 2021-05-18 PROBLEM — Z96.651 HISTORY OF ARTHROPLASTY OF RIGHT KNEE: Status: ACTIVE | Noted: 2021-05-16

## 2021-06-08 ENCOUNTER — TELEPHONE (OUTPATIENT)
Dept: NEUROLOGY | Facility: CLINIC | Age: 61
End: 2021-06-08

## 2021-06-08 DIAGNOSIS — G43.001 MIGRAINE WITHOUT AURA AND WITH STATUS MIGRAINOSUS, NOT INTRACTABLE: Primary | ICD-10-CM

## 2021-08-24 ENCOUNTER — OFFICE VISIT (OUTPATIENT)
Dept: NEUROLOGY | Facility: CLINIC | Age: 61
End: 2021-08-24
Payer: COMMERCIAL

## 2021-08-24 VITALS
HEIGHT: 62 IN | HEART RATE: 88 BPM | RESPIRATION RATE: 14 BRPM | DIASTOLIC BLOOD PRESSURE: 72 MMHG | WEIGHT: 189.38 LBS | BODY MASS INDEX: 34.85 KG/M2 | SYSTOLIC BLOOD PRESSURE: 120 MMHG

## 2021-08-24 DIAGNOSIS — G43.011 INTRACTABLE MIGRAINE WITHOUT AURA AND WITH STATUS MIGRAINOSUS: ICD-10-CM

## 2021-08-24 DIAGNOSIS — M54.2 NECK PAIN: Primary | ICD-10-CM

## 2021-08-24 DIAGNOSIS — F51.01 PRIMARY INSOMNIA: ICD-10-CM

## 2021-08-24 DIAGNOSIS — R25.1 TREMOR OF BOTH HANDS: ICD-10-CM

## 2021-08-24 DIAGNOSIS — G43.831 MENSTRUAL MIGRAINE, WITH INTRACTABLE MIGRAINE, SO STATED, WITH STATUS MIGRAINOSUS: ICD-10-CM

## 2021-08-24 PROCEDURE — 3078F DIAST BP <80 MM HG: CPT | Performed by: OTHER

## 2021-08-24 PROCEDURE — 3008F BODY MASS INDEX DOCD: CPT | Performed by: OTHER

## 2021-08-24 PROCEDURE — 3074F SYST BP LT 130 MM HG: CPT | Performed by: OTHER

## 2021-08-24 PROCEDURE — 99214 OFFICE O/P EST MOD 30 MIN: CPT | Performed by: OTHER

## 2021-08-24 RX ORDER — METHYLPREDNISOLONE 4 MG/1
TABLET ORAL
Qty: 1 EACH | Refills: 0 | Status: SHIPPED | OUTPATIENT
Start: 2021-08-24 | End: 2021-11-09 | Stop reason: ALTCHOICE

## 2021-08-24 RX ORDER — AMITRIPTYLINE HYDROCHLORIDE 25 MG/1
25 TABLET, FILM COATED ORAL NIGHTLY
Qty: 30 TABLET | Refills: 11 | Status: SHIPPED | OUTPATIENT
Start: 2021-08-24

## 2021-08-24 RX ORDER — ATORVASTATIN CALCIUM 40 MG/1
TABLET, FILM COATED ORAL
COMMUNITY
End: 2021-10-25

## 2021-08-24 RX ORDER — ZOLMITRIPTAN 5 MG/1
5 TABLET, ORALLY DISINTEGRATING ORAL AS NEEDED
Qty: 12 TABLET | Refills: 11 | Status: SHIPPED | OUTPATIENT
Start: 2021-08-24 | End: 2021-11-09

## 2021-08-24 RX ORDER — ERENUMAB-AOOE 70 MG/ML
70 INJECTION SUBCUTANEOUS
Qty: 1 ML | Refills: 11 | Status: SHIPPED | OUTPATIENT
Start: 2021-08-24 | End: 2021-11-09

## 2021-08-24 RX ORDER — NAPROXEN 500 MG/1
500 TABLET ORAL 2 TIMES DAILY WITH MEALS
Qty: 60 TABLET | Refills: 1 | Status: SHIPPED | OUTPATIENT
Start: 2021-08-24 | End: 2021-10-25

## 2021-08-24 NOTE — PROGRESS NOTES
[place] [facility] [place] [facility]  Neurology - Clinic Follow up  2021    Sandra Taveras Patient Status:  No patient class for patient encounter    3/26/1960 MRN [id number]   GZCD Repeated in 2008, Dr. Kath Solorio   • ENDOMETRIAL ABLATION      PT HAD RHABDOMYOLYSIS AFTER ABLATION, DONE WITH WATER   • HYSTERECTOMY  9/24/09    Supracervical hyst & BSO   • REVISE KNEE JOINT REPLACE,ALL PARTS Right 12/03/2020    Dr Cristian Brandt CAPSULES 3 TIMES DAILY. AFTER 3 DAYS, IF NO DROWSINESS,INCREASE TO 3 CAPSULES 3 TIMES DAILY,DO NOT STOP ABRUPTLY.  180 capsule 2   • ROSUVASTATIN CALCIUM 5 MG Oral Tab TAKE 1 TABLET BY MOUTH EVERY DAY AT NIGHT 90 tablet 0   • LEVOTHYROXINE SODIUM 125 MCG Ora intact for age, and Language is intact, no slurred speech; calm, no acute stress, pleasant,   CN is normal from II to XII, visual field is full, EOMI, pupil symmetrical, light reflexes are present, fundus exam is normal. Face symmetrical with normal sensat Tremor of both hands      Other Visit Diagnoses     Intractable migraine without aura and with status migrainosus        Relevant Medications    Zolmitriptan (ZOMIG ZMT) 5 MG Oral Tablet Dispersible    erenumab-aooe (AIMOVIG) 70 MG/ML Subcutaneous

## 2021-08-24 NOTE — PROGRESS NOTES
LOV 8/31/20 Headache f/u- Patient states she just got over a 3 day headache. Patient states before the previous episode she went a month with zero headaches.  Patient is taking aimovig every 30 days, imitrex for onset, & Venlafaxine HCl  MG Oral Capsu

## 2021-08-31 DIAGNOSIS — G43.001 MIGRAINE WITHOUT AURA AND WITH STATUS MIGRAINOSUS, NOT INTRACTABLE: ICD-10-CM

## 2021-08-31 RX ORDER — VENLAFAXINE HYDROCHLORIDE 150 MG/1
300 CAPSULE, EXTENDED RELEASE ORAL DAILY
Qty: 180 CAPSULE | Refills: 0 | Status: SHIPPED | OUTPATIENT
Start: 2021-08-31 | End: 2021-11-09

## 2021-08-31 NOTE — TELEPHONE ENCOUNTER
Medication: VENLAFAXINE 150 MG Oral Capsule SR 24 Hr    Date of last refill: 12/31/2020 (#60/5)  [place]    Last office visit: 08/24/2021  Due back to clinic per last office note:  Around 08/24/2022  Date next office

## 2021-10-22 DIAGNOSIS — M54.2 NECK PAIN: Primary | ICD-10-CM

## 2021-10-25 PROBLEM — E03.9 HYPOTHYROIDISM: Status: RESOLVED | Noted: 2021-10-25 | Resolved: 2021-10-25

## 2021-10-25 PROBLEM — F43.21 ADJUSTMENT DISORDER WITH DEPRESSED MOOD: Status: ACTIVE | Noted: 2021-10-25

## 2021-10-25 PROBLEM — I10 PRIMARY HYPERTENSION: Status: ACTIVE | Noted: 2021-10-25

## 2021-10-25 PROBLEM — F33.1 MODERATE RECURRENT MAJOR DEPRESSION (HCC): Status: ACTIVE | Noted: 2021-10-25

## 2021-10-25 PROBLEM — Z01.818 PREOP TESTING: Status: RESOLVED | Noted: 2020-12-03 | Resolved: 2021-10-25

## 2021-10-25 PROBLEM — E03.9 HYPOTHYROIDISM: Status: ACTIVE | Noted: 2021-10-25

## 2021-10-25 PROBLEM — M54.2 NECK PAIN: Status: RESOLVED | Noted: 2021-08-24 | Resolved: 2021-10-25

## 2021-10-25 PROBLEM — I10 ESSENTIAL HYPERTENSION: Status: RESOLVED | Noted: 2017-08-08 | Resolved: 2021-10-25

## 2021-10-25 PROBLEM — K59.09 CHRONIC CONSTIPATION: Status: RESOLVED | Noted: 2018-11-08 | Resolved: 2021-10-25

## 2021-10-25 RX ORDER — NAPROXEN 500 MG/1
TABLET ORAL
Qty: 60 TABLET | Refills: 2 | Status: SHIPPED | OUTPATIENT
Start: 2021-10-25 | End: 2021-11-09

## 2021-10-25 NOTE — TELEPHONE ENCOUNTER
Medication: NAPROXEN 500 MG Oral Tab    Date of last refill: 08/24/2021 (#60/1)  [place]    Last office visit: 08/24/2021  Due back to clinic per last office note:  Around 08/24/2022  Date next office visit scheduled

## 2021-10-28 ENCOUNTER — TELEPHONE (OUTPATIENT)
Dept: NEUROLOGY | Facility: CLINIC | Age: 61
End: 2021-10-28

## 2021-10-28 NOTE — TELEPHONE ENCOUNTER
Last visit we did not discuss the Botox, needs to be seen to make request Botox before office can do preauthorization

## 2021-10-28 NOTE — TELEPHONE ENCOUNTER
pt is wanting to start Botox; pt is asking if she could speak with a nurse to give her some clarification; pls advise

## 2021-10-28 NOTE — TELEPHONE ENCOUNTER
Pt asking questions regarding process of botox. Informed her of botox procedure and PA process. Pt would like to proceed with PA process. Will route to provider to verify okay to start.

## 2021-11-09 PROBLEM — G43.109 MIGRAINE WITH AURA AND WITHOUT STATUS MIGRAINOSUS, NOT INTRACTABLE: Status: ACTIVE | Noted: 2021-11-09

## 2021-11-09 PROBLEM — G44.221 CHRONIC TENSION-TYPE HEADACHE, INTRACTABLE: Status: ACTIVE | Noted: 2021-11-09

## 2021-11-09 NOTE — PROGRESS NOTES
Patient states increase in frequency of migraines since last office visit. Patient states left sided neck pain.

## 2021-11-09 NOTE — PROGRESS NOTES
[facility] in [place] with [facility]  Neurology - Clinic Follow up  2021    Krista Lomas Patient Status:  No patient class for patient encounter    3/26/1960 MRN [id number]   CHANCE Occassional, fell on ice 2 yrs ago had concussion   • Visual impairment     READERS       PAST SURGICAL HISTORY:  Past Surgical History:   Procedure Laterality Date   • ARTHROSCOPY OF JOINT UNLISTED Right     KNEE   •      • COLONOSCOPY   ABRUPTLY. (Patient taking differently: No sig reported) 180 capsule 2   • VENLAFAXINE 150 MG Oral Capsule SR 24 Hr TAKE 2 CAPSULES (300 MG TOTAL) BY MOUTH DAILY.  180 capsule 0   • ROSUVASTATIN 5 MG Oral Tab TAKE 1 TABLET BY MOUTH EVERY DAY AT NIGHT 90 tabl B/L;  Musculoskeletal: no joint tenderness, others see neuro exam;  Extremities:  no pitting edema, no cyanosis or skin rash,  pulse is present,    Neurologic Examination  Mental Status  Is intact for age, and Language is intact, no slurred speech; calm, n found.    Rikki:  Migraine  HA worse last few months, in spite of aimovig, she tried and failed to response to Effexor, Topamax, elavil, Depakote,  Aimovig, in past, will apply for Botox injection every three months,      continue  Zomig 5 mg PRN  Will c

## 2022-01-05 ENCOUNTER — TELEPHONE (OUTPATIENT)
Dept: NEUROLOGY | Facility: CLINIC | Age: 62
End: 2022-01-05

## 2022-01-05 DIAGNOSIS — G43.109 MIGRAINE WITH AURA AND WITHOUT STATUS MIGRAINOSUS, NOT INTRACTABLE: Primary | ICD-10-CM

## 2022-01-05 NOTE — TELEPHONE ENCOUNTER
Pt had appt with Dr. Pankaj Diaz and said that Botox was discussed but she has not heard back from our office regarding insurance approval. Pt would like to know status of that. Please advise.

## 2022-01-05 NOTE — TELEPHONE ENCOUNTER
Per Epic review, Dr Barby Norwood mentioned Botox PA in her notes but a PA was not submitted.    PA for Botox sent to PA Dept with [facility] request.

## 2022-01-07 ENCOUNTER — TELEPHONE (OUTPATIENT)
Dept: SURGERY | Facility: CLINIC | Age: 62
End: 2022-01-07

## 2022-01-07 DIAGNOSIS — G43.109 MIGRAINE WITH AURA AND WITHOUT STATUS MIGRAINOSUS, NOT INTRACTABLE: Primary | ICD-10-CM

## 2022-01-07 NOTE — TELEPHONE ENCOUNTER
jessica approval #[id number] 4 visits from 1/5/22-1/5/23    This is delivered from [address] I will have nurse sent an RX to them

## 2022-01-07 NOTE — TELEPHONE ENCOUNTER
Please send a prescription to [facility]:    Botox 200 units, 3 refills      Si units to be administered by MD into multiple sites of head, neck and scalp every 3 months for chronic migraine prophylaxis.     Approval #[id number] 4 visits from

## 2022-01-10 RX ORDER — ONABOTULINUMTOXINA 200 [USP'U]/1
INJECTION, POWDER, LYOPHILIZED, FOR SOLUTION INTRADERMAL; INTRAMUSCULAR
Qty: 1 EACH | Refills: 3 | Status: SHIPPED | OUTPATIENT
Start: 2022-01-10

## 2022-01-18 NOTE — TELEPHONE ENCOUNTER
Patient called she wanted to know why she has not heard anything on Botox. I explained it was approved for a year and that with her insurance the Botox needs to come from [address] [facility]. We sent the RX to [facility] on 1/10/22.  She can call them at [phone] to

## 2022-01-25 NOTE — TELEPHONE ENCOUNTER
Called [facility] and spoke with Poonam Ricardo. She confirmed delivery of Botox to the [place] office on 1/28/2022.  Confirmed address and hours

## 2022-01-28 NOTE — TELEPHONE ENCOUNTER
Botox received in:  [facility] [address], [address] [phone]  [address]    Lot No: [id number]  Exp Date: 06/2024  # Units: 200U     Pt has Appt scheduled on [facility] to schedule Botox appt wi

## 2022-02-14 ENCOUNTER — OFFICE VISIT (OUTPATIENT)
Dept: NEUROLOGY | Facility: CLINIC | Age: 62
End: 2022-02-14
Payer: COMMERCIAL

## 2022-02-14 VITALS
SYSTOLIC BLOOD PRESSURE: 126 MMHG | RESPIRATION RATE: 16 BRPM | HEART RATE: 96 BPM | HEIGHT: 63 IN | DIASTOLIC BLOOD PRESSURE: 88 MMHG | WEIGHT: 182.38 LBS | BODY MASS INDEX: 32.32 KG/M2

## 2022-02-14 DIAGNOSIS — M79.18 MYOFASCIAL PAIN SYNDROME: ICD-10-CM

## 2022-02-14 DIAGNOSIS — G44.221 CHRONIC TENSION-TYPE HEADACHE, INTRACTABLE: ICD-10-CM

## 2022-02-14 DIAGNOSIS — F51.01 PRIMARY INSOMNIA: ICD-10-CM

## 2022-02-14 DIAGNOSIS — G43.109 MIGRAINE WITH AURA AND WITHOUT STATUS MIGRAINOSUS, NOT INTRACTABLE: Primary | ICD-10-CM

## 2022-02-14 PROCEDURE — 3008F BODY MASS INDEX DOCD: CPT | Performed by: OTHER

## 2022-02-14 PROCEDURE — 3074F SYST BP LT 130 MM HG: CPT | Performed by: OTHER

## 2022-02-14 PROCEDURE — 64615 CHEMODENERV MUSC MIGRAINE: CPT | Performed by: OTHER

## 2022-02-14 PROCEDURE — 99213 OFFICE O/P EST LOW 20 MIN: CPT | Performed by: OTHER

## 2022-02-14 PROCEDURE — 3079F DIAST BP 80-89 MM HG: CPT | Performed by: OTHER

## 2022-02-14 RX ORDER — AMITRIPTYLINE HYDROCHLORIDE 25 MG/1
50 TABLET, FILM COATED ORAL NIGHTLY
Qty: 60 TABLET | Refills: 5 | Status: SHIPPED | OUTPATIENT
Start: 2022-02-14

## 2022-02-14 RX ORDER — ONDANSETRON 4 MG/1
4 TABLET, FILM COATED ORAL EVERY 8 HOURS PRN
Qty: 30 TABLET | Refills: 0 | Status: SHIPPED | OUTPATIENT
Start: 2022-02-14

## 2022-02-14 NOTE — PROGRESS NOTES
Paperwork noting that patient may bear financial responsibility for procedure(s) performed in clinic today signed prior to proceeding with procedure(s). Furthermore, patient notified that they should contact their insurer to verify that your procedure/test has been approved and is a COVERED benefit. Although the [facility] staff does its due diligence, the insurance carrier gives the disclaimer that \"Although the procedure is authorized, this does not guarantee payment. \"    Ultimately the patient is responsible for payment.     Botox is:  [] Buy and Bill  [x] Patient Supplied

## 2022-04-11 RX ORDER — NAPROXEN 500 MG/1
TABLET ORAL
Qty: 60 TABLET | Refills: 2 | Status: SHIPPED | OUTPATIENT
Start: 2022-04-11

## 2022-04-26 ENCOUNTER — TELEPHONE (OUTPATIENT)
Dept: SURGERY | Facility: CLINIC | Age: 62
End: 2022-04-26

## 2022-05-16 ENCOUNTER — OFFICE VISIT (OUTPATIENT)
Dept: NEUROLOGY | Facility: CLINIC | Age: 62
End: 2022-05-16
Payer: COMMERCIAL

## 2022-05-16 VITALS
WEIGHT: 178.19 LBS | HEART RATE: 79 BPM | DIASTOLIC BLOOD PRESSURE: 70 MMHG | SYSTOLIC BLOOD PRESSURE: 110 MMHG | BODY MASS INDEX: 31.57 KG/M2 | HEIGHT: 63 IN | RESPIRATION RATE: 16 BRPM

## 2022-05-16 DIAGNOSIS — G43.011 INTRACTABLE MIGRAINE WITHOUT AURA AND WITH STATUS MIGRAINOSUS: ICD-10-CM

## 2022-05-16 DIAGNOSIS — G44.221 CHRONIC TENSION-TYPE HEADACHE, INTRACTABLE: ICD-10-CM

## 2022-05-16 DIAGNOSIS — G43.109 MIGRAINE WITH AURA AND WITHOUT STATUS MIGRAINOSUS, NOT INTRACTABLE: Primary | ICD-10-CM

## 2022-05-16 DIAGNOSIS — M54.2 NECK PAIN: ICD-10-CM

## 2022-05-16 PROCEDURE — 3078F DIAST BP <80 MM HG: CPT | Performed by: OTHER

## 2022-05-16 PROCEDURE — 64615 CHEMODENERV MUSC MIGRAINE: CPT | Performed by: OTHER

## 2022-05-16 PROCEDURE — 3074F SYST BP LT 130 MM HG: CPT | Performed by: OTHER

## 2022-05-16 PROCEDURE — 3008F BODY MASS INDEX DOCD: CPT | Performed by: OTHER

## 2022-05-16 PROCEDURE — 99213 OFFICE O/P EST LOW 20 MIN: CPT | Performed by: OTHER

## 2022-05-16 RX ORDER — AMITRIPTYLINE HYDROCHLORIDE 25 MG/1
50 TABLET, FILM COATED ORAL NIGHTLY
Qty: 60 TABLET | Refills: 5 | Status: SHIPPED | OUTPATIENT
Start: 2022-05-16

## 2022-05-16 RX ORDER — NAPROXEN 500 MG/1
500 TABLET ORAL 2 TIMES DAILY WITH MEALS
Qty: 60 TABLET | Refills: 3 | Status: SHIPPED | OUTPATIENT
Start: 2022-05-16

## 2022-05-16 RX ORDER — ERENUMAB-AOOE 70 MG/ML
70 INJECTION SUBCUTANEOUS
Qty: 1 ML | Refills: 11 | Status: SHIPPED | OUTPATIENT
Start: 2022-05-16

## 2022-05-16 RX ORDER — ZOLMITRIPTAN 5 MG/1
5 TABLET, ORALLY DISINTEGRATING ORAL AS NEEDED
Qty: 12 TABLET | Refills: 11 | Status: SHIPPED | OUTPATIENT
Start: 2022-05-16

## 2022-05-16 NOTE — PROGRESS NOTES
Paperwork noting that patient may bear financial responsibility for procedure(s) performed in clinic today signed prior to proceeding with procedure(s). Furthermore, patient notified that they should contact their insurer to verify that your procedure/test has been approved and is a COVERED benefit. Although the [facility] staff does its due diligence, the insurance carrier gives the disclaimer that \"Although the procedure is authorized, this does not guarantee payment. \"    Ultimately the patient is responsible for payment.     Botox is:  [] Buy and Bill  [x] Patient Supplied

## 2022-06-08 ENCOUNTER — TELEPHONE (OUTPATIENT)
Dept: NEUROLOGY | Facility: CLINIC | Age: 62
End: 2022-06-08

## 2022-06-08 NOTE — TELEPHONE ENCOUNTER
Received fax from [address] [place] [address] requesting PA for;    AIMOVIG 70 MG/ML    PA started, all questions answered. Awaiting payor determination.

## 2022-07-22 ENCOUNTER — TELEPHONE (OUTPATIENT)
Dept: NEUROLOGY | Facility: CLINIC | Age: 62
End: 2022-07-22

## 2022-07-22 NOTE — TELEPHONE ENCOUNTER
Received faxed from [facility] stating that the patient confirmed office address and set up delivery to be on 07/26/22. Patient is not scheduled for Botox as of 07/22/22.

## 2022-08-01 ENCOUNTER — TELEPHONE (OUTPATIENT)
Dept: NEUROLOGY | Facility: CLINIC | Age: 62
End: 2022-08-01

## 2022-08-01 NOTE — TELEPHONE ENCOUNTER
LM for pt to r/s appt currently scheduled with Dr Sandra Gonzales on 8.10.22- Dr Sandra Gonzales does not see Botox patients. Appt being held with Dr Angie Fishman on 8.17.22 at 3:20.   Pls assist pt in r/s appt when call is returned

## 2022-08-03 DIAGNOSIS — G43.109 MIGRAINE WITH AURA AND WITHOUT STATUS MIGRAINOSUS, NOT INTRACTABLE: Primary | ICD-10-CM

## 2022-08-04 RX ORDER — ONDANSETRON 4 MG/1
4 TABLET, FILM COATED ORAL EVERY 8 HOURS PRN
Qty: 30 TABLET | Refills: 1 | Status: SHIPPED | OUTPATIENT
Start: 2022-08-04

## 2022-08-17 ENCOUNTER — OFFICE VISIT (OUTPATIENT)
Dept: NEUROLOGY | Facility: CLINIC | Age: 62
End: 2022-08-17
Payer: COMMERCIAL

## 2022-08-17 VITALS
DIASTOLIC BLOOD PRESSURE: 80 MMHG | HEIGHT: 63 IN | RESPIRATION RATE: 16 BRPM | SYSTOLIC BLOOD PRESSURE: 127 MMHG | BODY MASS INDEX: 31.54 KG/M2 | HEART RATE: 86 BPM | WEIGHT: 178 LBS

## 2022-08-17 DIAGNOSIS — G43.109 MIGRAINE WITH AURA AND WITHOUT STATUS MIGRAINOSUS, NOT INTRACTABLE: Primary | ICD-10-CM

## 2022-08-17 DIAGNOSIS — G43.709 CHRONIC MIGRAINE W/O AURA W/O STATUS MIGRAINOSUS, NOT INTRACTABLE: ICD-10-CM

## 2022-08-17 PROCEDURE — 3008F BODY MASS INDEX DOCD: CPT | Performed by: OTHER

## 2022-08-17 PROCEDURE — 3074F SYST BP LT 130 MM HG: CPT | Performed by: OTHER

## 2022-08-17 PROCEDURE — 3079F DIAST BP 80-89 MM HG: CPT | Performed by: OTHER

## 2022-08-17 PROCEDURE — 64615 CHEMODENERV MUSC MIGRAINE: CPT | Performed by: OTHER

## 2022-08-17 NOTE — PROGRESS NOTES
Paperwork noting that patient may bear financial responsibility for procedure(s) performed in clinic today signed prior to proceeding with procedure(s). Furthermore, patient notified that they should contact their insurer to verify that your procedure/test has been approved and is a COVERED benefit. Although the [facility] staff does its due diligence, the insurance carrier gives the disclaimer that \"Although the procedure is authorized, this does not guarantee payment. \"    Ultimately the patient is responsible for payment. Botox is:  [] Buy and Bill  [x] Patient Supplied      Botox Reauthorization Questions:  1. Has the patient experienced a reduction in frequency of migraines since starting Botox? yes  a. If yes, by what percentage? 50%  2. Has the intensity of migraines decreased since starting Botox? yes  a. If yes, by what percentage?  50%

## 2022-11-04 DIAGNOSIS — M54.2 NECK PAIN: ICD-10-CM

## 2022-11-04 RX ORDER — NAPROXEN 500 MG/1
500 TABLET ORAL 2 TIMES DAILY WITH MEALS
Qty: 60 TABLET | Refills: 5 | Status: SHIPPED | OUTPATIENT
Start: 2022-11-04

## 2022-11-04 NOTE — TELEPHONE ENCOUNTER
Medication: Naproxen 500 mg     Date of last refill: 05/13/2022 with 3 addt refills  [place]      Last office visit: 08/17/2022  Due back to clinic per last office note:    Date next office visit scheduled:    No future appointments.         Last OV note recommendation:    Botox

## 2022-11-26 DIAGNOSIS — G43.109 MIGRAINE WITH AURA AND WITHOUT STATUS MIGRAINOSUS, NOT INTRACTABLE: ICD-10-CM

## 2022-11-28 RX ORDER — ONDANSETRON 4 MG/1
TABLET, FILM COATED ORAL
Qty: 30 TABLET | Refills: 5 | Status: SHIPPED | OUTPATIENT
Start: 2022-11-28

## 2022-11-28 RX ORDER — AMITRIPTYLINE HYDROCHLORIDE 25 MG/1
50 TABLET, FILM COATED ORAL NIGHTLY
Qty: 60 TABLET | Refills: 5 | Status: SHIPPED | OUTPATIENT
Start: 2022-11-28

## 2023-06-08 DIAGNOSIS — M54.2 NECK PAIN: ICD-10-CM

## 2023-06-08 RX ORDER — NAPROXEN 500 MG/1
TABLET ORAL
Qty: 60 TABLET | Refills: 0 | Status: SHIPPED | OUTPATIENT
Start: 2023-06-08

## 2023-07-06 DIAGNOSIS — M54.2 NECK PAIN: ICD-10-CM

## 2023-07-06 NOTE — TELEPHONE ENCOUNTER
Medication: naproxen 500 MG     Date of last refill: 06/08/2023  [place]     Last office visit: 8/17/22 Botox  Due back to clinic per last office note:  no mention  Date next office visit scheduled:    No future appointments.         Last OV note recommendation:     IMPRESSION:  Migraine with aura and without status migrainosus, not intractable  (primary encounter diagnosis)  Chronic migraine w/o aura w/o status migrainosus, not intractable     Post injections instructions:  Expect response to start on the second or going into the 3rd week  Use ice packs and Tylenol ES if with pain at injections sites  Report any signs of infection or inflammation at site of injection  Use migraine medications the same was as before

## 2023-07-07 RX ORDER — NAPROXEN 500 MG/1
TABLET ORAL
Qty: 60 TABLET | Refills: 0 | Status: SHIPPED | OUTPATIENT
Start: 2023-07-07

## 2023-07-26 RX ORDER — AMITRIPTYLINE HYDROCHLORIDE 25 MG/1
50 TABLET, FILM COATED ORAL NIGHTLY
Qty: 180 TABLET | Refills: 0 | Status: SHIPPED | OUTPATIENT
Start: 2023-07-26

## 2023-07-26 NOTE — TELEPHONE ENCOUNTER
Medication: amitriptyline 25 MG      Date of last refill: 11/28/22 (#60/5R)  [place]     Last office visit: 8/17/22  Due back to clinic per last office note:  Botox protocol  Date next office visit scheduled:    No future appointments.         Last OV note recommendation:    IMPRESSION:  Migraine with aura and without status migrainosus, not intractable  (primary encounter diagnosis)  Chronic migraine w/o aura w/o status migrainosus, not intractable     Post injections instructions:  Expect response to start on the second or going into the 3rd week  Use ice packs and Tylenol ES if with pain at injections sites  Report any signs of infection or inflammation at site of injection  Use migraine medications the same was as before

## 2023-07-26 NOTE — TELEPHONE ENCOUNTER
Per EMR, LOV Botox w/ Dr Salome Dennis 5/16/22 per Dr Hugo Kim sent to pt notifying due for appt w/ Dr Carlos Alberto Stephen

## 2023-07-30 DIAGNOSIS — M54.2 NECK PAIN: ICD-10-CM

## 2023-07-31 RX ORDER — NAPROXEN 500 MG/1
500 TABLET ORAL 2 TIMES DAILY WITH MEALS
Qty: 60 TABLET | Refills: 0 | Status: SHIPPED | OUTPATIENT
Start: 2023-07-31

## 2023-07-31 NOTE — TELEPHONE ENCOUNTER
Medication: Naproxen 500 MG      Date of last refill: 07/07/2023  [place]     Last office visit: 8/17/22  Due back to clinic per last office note:  Botox protocol  Date next office visit scheduled:    No future appointments.         Last OV note recommendation:     IMPRESSION:  Migraine with aura and without status migrainosus, not intractable  (primary encounter diagnosis)  Chronic migraine w/o aura w/o status migrainosus, not intractable     Post injections instructions:  Expect response to start on the second or going into the 3rd week  Use ice packs and Tylenol ES if with pain at injections sites  Report any signs of infection or inflammation at site of injection  Use migraine medications the same was as before

## 2023-08-27 DIAGNOSIS — M54.2 NECK PAIN: ICD-10-CM

## 2023-08-28 RX ORDER — NAPROXEN 500 MG/1
500 TABLET ORAL 2 TIMES DAILY WITH MEALS
Qty: 60 TABLET | Refills: 0 | Status: SHIPPED | OUTPATIENT
Start: 2023-08-28

## 2023-08-28 NOTE — TELEPHONE ENCOUNTER
Medication: Naproxen 500MG      Date of last refill: 07/31/2023  [place]     Last office visit: 8/17/22  Due back to clinic per last office note:  Botox protocol  Date next office visit scheduled:    No future appointments.         Last OV note recommendation:     IMPRESSION:  Migraine with aura and without status migrainosus, not intractable  (primary encounter diagnosis)  Chronic migraine w/o aura w/o status migrainosus, not intractable     Post injections instructions:  Expect response to start on the second or going into the 3rd week  Use ice packs and Tylenol ES if with pain at injections sites  Report any signs of infection or inflammation at site of injection  Use migraine medications the same was as before

## 2023-09-29 DIAGNOSIS — M54.2 NECK PAIN: ICD-10-CM

## 2023-09-29 RX ORDER — NAPROXEN 500 MG/1
500 TABLET ORAL 2 TIMES DAILY WITH MEALS
Qty: 60 TABLET | Refills: 0 | OUTPATIENT
Start: 2023-09-29

## 2023-09-29 NOTE — TELEPHONE ENCOUNTER
LOV 8/2022  Multiple reach outs to schedule appt, none successful    Given to PSR to schedule, no further refills until seen in office    RN, please refuse as per above

## 2023-09-29 NOTE — TELEPHONE ENCOUNTER
Medication: naproxen 500 MG      Date of last refill: 8/28/23 (#60/0R)  [place]     Last office visit: 8/17/22 Botox  Due back to clinic per last office note:  no mention  Date next office visit scheduled:    No future appointments.         Last OV note recommendation:    IMPRESSION:  Migraine with aura and without status migrainosus, not intractable  (primary encounter diagnosis)  Chronic migraine w/o aura w/o status migrainosus, not intractable     Post injections instructions:  Expect response to start on the second or going into the 3rd week  Use ice packs and Tylenol ES if with pain at injections sites  Report any signs of infection or inflammation at site of injection  Use migraine medications the same was as before

## 2024-04-25 NOTE — TELEPHONE ENCOUNTER
PA for Aimovig submitted on [facility], Key:[id number].
[employer] approved 6/8/2021-6/15/2022. Message left on [facility].
no

## (undated) DIAGNOSIS — M54.2 NECK PAIN: ICD-10-CM

## (undated) DEVICE — CHLORAPREP 26ML APPLICATOR

## (undated) DEVICE — Device: Brand: STABLECUT®

## (undated) DEVICE — TRAY SKIN PREP PVP-1

## (undated) DEVICE — SUTURE VICRYL 2-0 CP-1

## (undated) DEVICE — 3M™ IOBAN™ 2 ANTIMICROBIAL INCISE DRAPE 6640EZ: Brand: IOBAN™ 2

## (undated) DEVICE — Device

## (undated) DEVICE — SOL  .9 3000ML

## (undated) DEVICE — GOWN,SIRUS,FABRIC-REINFORCED,LARGE: Brand: MEDLINE

## (undated) DEVICE — SOL  .9 1000ML BAG

## (undated) DEVICE — ENCORE® LATEX ACCLAIM SIZE 8, STERILE LATEX POWDER-FREE SURGICAL GLOVE: Brand: ENCORE

## (undated) DEVICE — REM POLYHESIVE ADULT PATIENT RETURN ELECTRODE: Brand: VALLEYLAB

## (undated) DEVICE — 25MM FEMALE SCREW-Z

## (undated) DEVICE — SUTURE VICRYL 3-0 SH

## (undated) DEVICE — ABSORBABLE HEMOSTAT (OXIDIZED REGENERATED CELLULOSE, U.S.P.): Brand: SURGICEL

## (undated) DEVICE — CAUTERY NEEDLE 2IN INS E1465

## (undated) DEVICE — KENDALL SCD EXPRESS SLEEVES, KNEE LENGTH, MEDIUM: Brand: KENDALL SCD

## (undated) DEVICE — SPONGE LAP 18X18 XRAY STRL

## (undated) DEVICE — PRECISION (9.0 X 0.51 X 31.0MM)

## (undated) DEVICE — GAUZE SPONGES,USP TYPE VII GAUZE, 12 PLY: Brand: CURITY

## (undated) DEVICE — 3M(TM) MICROPORE TAPE DISPENSER 1535-2: Brand: 3M™ MICROPORE™

## (undated) DEVICE — GLOVE BIOGEL M SURG SZ 6-1/2

## (undated) DEVICE — NEEDLE HPO 18GA 1.5IN ECLPS

## (undated) DEVICE — HOOD: Brand: FLYTE

## (undated) DEVICE — DRESSING HDRCLD 35X9CM AQCL

## (undated) DEVICE — BLADE SAW DEPUY

## (undated) DEVICE — SUTURE VICRYL 1 OS-6

## (undated) DEVICE — PADDING 4YDX6IN CTTN STRL WBRL

## (undated) DEVICE — STERILE TETRA-FLEX CF, ELASTIC BANDAGE LATEX FREE 6IN X5.5 YD: Brand: TETRA-FLEX™CF

## (undated) DEVICE — WRAP COOLING KNEE W/ICE PILLOW

## (undated) DEVICE — BIPOLAR SEALER 23-112-1 AQM 6.0: Brand: AQUAMANTYS™

## (undated) DEVICE — PROXIMATE SKIN STAPLERS (35 WIDE) CONTAINS 35 STAINLESS STEEL STAPLES (FIXED HEAD): Brand: PROXIMATE

## (undated) DEVICE — TONGUE DEPRESSOR 6IN STR

## (undated) DEVICE — SOL  .9 1000ML BTL

## (undated) DEVICE — SUTURE FIBERWIRE 2 AR-7202

## (undated) DEVICE — MINI LAP PACK-LF: Brand: MEDLINE INDUSTRIES, INC.

## (undated) DEVICE — SUTURE MONOCRYL 4-0 PS-2

## (undated) DEVICE — SPONGE: SPECIALTY PEANUT XR 100/CS: Brand: MEDICAL ACTION INDUSTRIES

## (undated) DEVICE — PROBE 8225101 5PK STD PRASS FL TIP ROHS

## (undated) DEVICE — TOTAL KNEE: Brand: MEDLINE INDUSTRIES, INC.

## (undated) DEVICE — SUTURE SILK 3-0

## (undated) DEVICE — SUTURE SILK 4-0

## (undated) DEVICE — 20 ML SYRINGE LUER-LOCK TIP: Brand: MONOJECT

## (undated) DEVICE — BOWL CEMENT MIX QUICK-VAC

## (undated) DEVICE — TOURNIQUET CUFF 34 STR

## (undated) DEVICE — EMG TUBE 8229706 NIM TRIVANTAGE 6.0MM ID: Brand: NIM TRIVANTAGE™

## (undated) DEVICE — CORDLESS ULTRASONIC DISSECTOR: Brand: SONICISION

## (undated) DEVICE — LIGACLIP EXTRA LIGATING CLIP CARTRIDGES: 6 TITANIUM CLIPS/ CARTRIDGE (SMALL): Brand: LIGACLIP

## (undated) NOTE — LETTER
[facility]  Phone: [phone]  OUTSIDE TESTING RESULT REQUEST      TO:   DR Camille Holguin    Today's Date: 4/26/17    FAX #: [phone]     IMPORTANT: FOR YOUR IMMEDIATE ATTENTION  Please FAX all test results listed below to: 6

## (undated) NOTE — IP AVS SNAPSHOT
[facility] [place] [address] [place], [address] ~ [phone]                Discharge Summary   5/16/2017    Cindi Coma           Admission Information        Provider Department    5/16/2017 Rin Flores MD  3nw-[id number] Commonly known as:  ULTRAM        Take 1 tablet (50 mg total) by mouth every 6 (six) hours as needed for Pain.    Stop taking on:  5/26/2017    Jess Chung taking these medications        Instructions Authorizing Prov Where to Get Your Medications      These medications were sent to [facility][id number]- [place]  [place], [phone], [address], [place] [address]     Phone:  [phone] Immunization History as of 5/17/2017  Reviewed on 5/16/2017    INFLUENZA 2/7/2017, 71/38/0785      Metabolic Lab Results  (Last result in the past 90 days)    HgbA1C Glucose BUN Creatinine Calcium Alkaline Phosph AST    -- (05/08/17)  136 (H) (05/08/17)  1 Summaries. If you've been to the Emergency Department or your doctor's office, you can view your past visit information in [facility] by going to Visits < Visit Summaries. [facility]ch questions? Call [phone] for help.   [facility] is NOT to be used for urge Non-Narcotic Pain Medications     ibuprofen 800 MG Oral Tab    SUMAtriptan Succinate (IMITREX) 100 MG Oral Tab       Use: Treat pain, fever, inflammation   Most common side effects: Stomach upset   What to report to your healthcare team: Stomach upset, unr